# Patient Record
Sex: MALE | Race: WHITE | NOT HISPANIC OR LATINO | URBAN - METROPOLITAN AREA
[De-identification: names, ages, dates, MRNs, and addresses within clinical notes are randomized per-mention and may not be internally consistent; named-entity substitution may affect disease eponyms.]

---

## 2017-06-05 ENCOUNTER — OUTPATIENT (OUTPATIENT)
Dept: OUTPATIENT SERVICES | Facility: HOSPITAL | Age: 72
LOS: 1 days | End: 2017-06-05
Payer: MEDICARE

## 2017-06-05 VITALS
HEART RATE: 82 BPM | WEIGHT: 291.89 LBS | DIASTOLIC BLOOD PRESSURE: 80 MMHG | OXYGEN SATURATION: 99 % | SYSTOLIC BLOOD PRESSURE: 102 MMHG | RESPIRATION RATE: 14 BRPM | HEIGHT: 76 IN | TEMPERATURE: 99 F

## 2017-06-05 DIAGNOSIS — Z90.89 ACQUIRED ABSENCE OF OTHER ORGANS: Chronic | ICD-10-CM

## 2017-06-05 DIAGNOSIS — Z96.7 PRESENCE OF OTHER BONE AND TENDON IMPLANTS: Chronic | ICD-10-CM

## 2017-06-05 DIAGNOSIS — Z90.49 ACQUIRED ABSENCE OF OTHER SPECIFIED PARTS OF DIGESTIVE TRACT: Chronic | ICD-10-CM

## 2017-06-05 DIAGNOSIS — I48.91 UNSPECIFIED ATRIAL FIBRILLATION: ICD-10-CM

## 2017-06-05 DIAGNOSIS — Z98.89 OTHER SPECIFIED POSTPROCEDURAL STATES: Chronic | ICD-10-CM

## 2017-06-05 DIAGNOSIS — N40.1 BENIGN PROSTATIC HYPERPLASIA WITH LOWER URINARY TRACT SYMPTOMS: ICD-10-CM

## 2017-06-05 DIAGNOSIS — N40.0 BENIGN PROSTATIC HYPERPLASIA WITHOUT LOWER URINARY TRACT SYMPTOMS: ICD-10-CM

## 2017-06-05 DIAGNOSIS — Z96.649 PRESENCE OF UNSPECIFIED ARTIFICIAL HIP JOINT: Chronic | ICD-10-CM

## 2017-06-05 LAB
APPEARANCE UR: CLEAR — SIGNIFICANT CHANGE UP
BACTERIA # UR AUTO: SIGNIFICANT CHANGE UP
BILIRUB UR-MCNC: NEGATIVE — SIGNIFICANT CHANGE UP
BLD GP AB SCN SERPL QL: NEGATIVE — SIGNIFICANT CHANGE UP
BLOOD UR QL VISUAL: NEGATIVE — SIGNIFICANT CHANGE UP
BUN SERPL-MCNC: 22 MG/DL — SIGNIFICANT CHANGE UP (ref 7–23)
CALCIUM SERPL-MCNC: 9.9 MG/DL — SIGNIFICANT CHANGE UP (ref 8.4–10.5)
CHLORIDE SERPL-SCNC: 99 MMOL/L — SIGNIFICANT CHANGE UP (ref 98–107)
CO2 SERPL-SCNC: 27 MMOL/L — SIGNIFICANT CHANGE UP (ref 22–31)
COD CRY URNS QL: SIGNIFICANT CHANGE UP (ref 0–0)
COLOR SPEC: YELLOW — SIGNIFICANT CHANGE UP
CREAT SERPL-MCNC: 1.2 MG/DL — SIGNIFICANT CHANGE UP (ref 0.5–1.3)
GLUCOSE SERPL-MCNC: 115 MG/DL — HIGH (ref 70–99)
GLUCOSE UR-MCNC: NEGATIVE — SIGNIFICANT CHANGE UP
HCT VFR BLD CALC: 50.8 % — HIGH (ref 39–50)
HGB BLD-MCNC: 17.5 G/DL — HIGH (ref 13–17)
KETONES UR-MCNC: NEGATIVE — SIGNIFICANT CHANGE UP
LEUKOCYTE ESTERASE UR-ACNC: NEGATIVE — SIGNIFICANT CHANGE UP
MCHC RBC-ENTMCNC: 32.4 PG — SIGNIFICANT CHANGE UP (ref 27–34)
MCHC RBC-ENTMCNC: 34.4 % — SIGNIFICANT CHANGE UP (ref 32–36)
MCV RBC AUTO: 94.1 FL — SIGNIFICANT CHANGE UP (ref 80–100)
MUCOUS THREADS # UR AUTO: SIGNIFICANT CHANGE UP
NITRITE UR-MCNC: NEGATIVE — SIGNIFICANT CHANGE UP
PH UR: 6 — SIGNIFICANT CHANGE UP (ref 4.6–8)
PLATELET # BLD AUTO: 172 K/UL — SIGNIFICANT CHANGE UP (ref 150–400)
PMV BLD: 11.1 FL — SIGNIFICANT CHANGE UP (ref 7–13)
POTASSIUM SERPL-MCNC: 4.7 MMOL/L — SIGNIFICANT CHANGE UP (ref 3.5–5.3)
POTASSIUM SERPL-SCNC: 4.7 MMOL/L — SIGNIFICANT CHANGE UP (ref 3.5–5.3)
PROT UR-MCNC: 20 — SIGNIFICANT CHANGE UP
RBC # BLD: 5.4 M/UL — SIGNIFICANT CHANGE UP (ref 4.2–5.8)
RBC # FLD: 13.1 % — SIGNIFICANT CHANGE UP (ref 10.3–14.5)
RBC CASTS # UR COMP ASSIST: SIGNIFICANT CHANGE UP (ref 0–?)
RH IG SCN BLD-IMP: POSITIVE — SIGNIFICANT CHANGE UP
SODIUM SERPL-SCNC: 141 MMOL/L — SIGNIFICANT CHANGE UP (ref 135–145)
SP GR SPEC: 1.02 — SIGNIFICANT CHANGE UP (ref 1–1.03)
UROBILINOGEN FLD QL: NORMAL E.U. — SIGNIFICANT CHANGE UP (ref 0.1–0.2)
WBC # BLD: 7.22 K/UL — SIGNIFICANT CHANGE UP (ref 3.8–10.5)
WBC # FLD AUTO: 7.22 K/UL — SIGNIFICANT CHANGE UP (ref 3.8–10.5)
WBC UR QL: SIGNIFICANT CHANGE UP (ref 0–?)

## 2017-06-05 PROCEDURE — 93010 ELECTROCARDIOGRAM REPORT: CPT

## 2017-06-05 NOTE — H&P PST ADULT - NEGATIVE GASTROINTESTINAL SYMPTOMS
no change in bowel habits/no abdominal pain no constipation/no diarrhea/no abdominal pain/no change in bowel habits/no nausea/no vomiting

## 2017-06-05 NOTE — H&P PST ADULT - NEGATIVE GENERAL SYMPTOMS
no sweating/no anorexia/no weight loss/no malaise/no fever/no fatigue/no chills/no weight gain no anorexia/no polyuria/no fever/no weight loss/no chills/no fatigue/no weight gain/no polyphagia/no polydipsia/no sweating/no malaise

## 2017-06-05 NOTE — H&P PST ADULT - NEGATIVE ENMT SYMPTOMS
no throat pain/no nose bleeds/no hearing difficulty/no tinnitus/no nasal obstruction/no recurrent cold sores/no dry mouth/no nasal discharge/no nasal congestion/no abnormal taste sensation/no ear pain/no gum bleeding/no sinus symptoms/no vertigo

## 2017-06-05 NOTE — H&P PST ADULT - PROBLEM SELECTOR PLAN 2
hx afib, cardiac stent  as per cardiology clearance pt can stop xarelto 5 days prior to procedure last dose 6/10/2017, hx cardiac stent instructed to continue asa

## 2017-06-05 NOTE — H&P PST ADULT - HISTORY OF PRESENT ILLNESS
71y/o male scheduled for cystoscopy, transurethral resection of the prostate on 6/16/2017.  Pt states, "since having right hip replacement developed urinary retention.  F/u showed enlarged prostate.  Continues to c/o slow stream, hesitancy's."

## 2017-06-05 NOTE — H&P PST ADULT - NEGATIVE PSYCHIATRIC SYMPTOMS
no insomnia/no memory loss/no mood swings/no paranoia/no depression/no anxiety no insomnia/no anxiety/no depression/no suicidal ideation

## 2017-06-05 NOTE — H&P PST ADULT - MUSCULOSKELETAL
details… detailed exam no joint swelling/decreased ROM due to pain/no calf tenderness/no joint erythema/no joint warmth normal strength/ROM intact

## 2017-06-05 NOTE — H&P PST ADULT - PROBLEM SELECTOR PLAN 1
Pt scheduled for cystoscopy, transurethral resection of the prostate on 6/16/2017.  labs done cbc, bmp, t&s, ua, c/s results pending, ekg done.  Pt went for cardiology clearance.  Preop teaching done, pt able to verbalize understanding.

## 2017-06-05 NOTE — H&P PST ADULT - NSANTHOSAYNRD_GEN_A_CORE
No. ROCIO screening performed.  STOP BANG Legend: 0-2 = LOW Risk; 3-4 = INTERMEDIATE Risk; 5-8 = HIGH Risk

## 2017-06-05 NOTE — H&P PST ADULT - NEGATIVE GENERAL GENITOURINARY SYMPTOMS
no dysuria/no bladder infections/no incontinence no flank pain R/no dysuria/no hematuria/no flank pain L/no incontinence/no bladder infections

## 2017-06-05 NOTE — H&P PST ADULT - NEGATIVE SKIN SYMPTOMS
no pitted nails/no hair loss/no brittle nails/no rash/no itching no rash/no pitted nails/no hair loss/no brittle nails/no itching/no dryness

## 2017-06-05 NOTE — H&P PST ADULT - VISION (WITH CORRECTIVE LENSES IF THE PATIENT USUALLY WEARS THEM):
wears reading glasses/Normal vision: sees adequately in most situations; can see medication labels, newsprint

## 2017-06-05 NOTE — H&P PST ADULT - RS GEN PE MLT RESP DETAILS PC
no subcutaneous emphysema/clear to auscultation bilaterally/no wheezes/no rales/airway patent/no intercostal retractions/good air movement/breath sounds equal/respirations non-labored/no rhonchi respirations non-labored/clear to auscultation bilaterally/no wheezes/breath sounds equal/good air movement/no rhonchi/no rales/no subcutaneous emphysema/no intercostal retractions

## 2017-06-05 NOTE — H&P PST ADULT - NEGATIVE LYMPHATIC SYMPTOMS
no enlarged lymph nodes/no tender lymph nodes no tender lymph nodes/no swelling of extremity/no enlarged lymph nodes

## 2017-06-05 NOTE — H&P PST ADULT - NEGATIVE BREAST SYMPTOMS
no breast tenderness L/no breast lump L/no breast lump R/no nipple discharge R/no nipple discharge L/no breast tenderness R

## 2017-06-05 NOTE — H&P PST ADULT - PMH
Atrial fibrillation    BPH (benign prostatic hypertrophy)    CAD (coronary artery disease)    Dyslipidemia    Glaucoma    Hip pain, right    Hypertension    Obesity    Osteoarthritis of right hip    Stented coronary artery  X1, 2015  Varicose vein of leg  right Atrial fibrillation    Benign prostatic hyperplasia    BPH (benign prostatic hypertrophy)    CAD (coronary artery disease)    Dyslipidemia    Glaucoma    Hip pain, right    Hypertension    Obesity    Osteoarthritis of right hip    Stented coronary artery  X1, 2015  Varicose vein of leg  right

## 2017-06-05 NOTE — H&P PST ADULT - FAMILY HISTORY
Mother  Still living? No  Family history of aortic aneurysm, Age at diagnosis: Age Unknown     Father  Still living? No  Family history of colon cancer, Age at diagnosis: Age Unknown

## 2017-06-05 NOTE — H&P PST ADULT - PSH
S/P angioplasty with stent  1 stent LAD 9/29/2015  S/P cholecystectomy  2006  S/P ORIF (open reduction internal fixation) fracture  right ankle   2003  S/P tonsillectomy  childhood S/P angioplasty with stent  1 stent LAD 9/29/2015  S/P cholecystectomy  2006  S/P hip replacement  right hip replacement 5/2016  S/P ORIF (open reduction internal fixation) fracture  right ankle   2003  S/P tonsillectomy  childhood

## 2017-06-05 NOTE — H&P PST ADULT - NEGATIVE OPHTHALMOLOGIC SYMPTOMS
no diplopia/no photophobia/no loss of vision L no diplopia/no discharge L/no discharge R/no pain L/no loss of vision L/no lacrimation R/no blurred vision L/no pain R/no irritation L/no lacrimation L/no photophobia/no loss of vision R/no scleral injection R/no scleral injection L/no blurred vision R/no irritation R

## 2017-06-05 NOTE — H&P PST ADULT - VENOUS THROMBOEMBOLISM CURRENT STATUS
elective major lower extremity arthroplasty/varicose veins/swollen legs major surgery lasting over 3 hrs

## 2017-06-05 NOTE — H&P PST ADULT - NEGATIVE CARDIOVASCULAR SYMPTOMS
no dyspnea on exertion/no peripheral edema no paroxysmal nocturnal dyspnea/no dyspnea on exertion/no claudication/no peripheral edema/no orthopnea/no palpitations/no chest pain

## 2017-06-05 NOTE — H&P PST ADULT - GASTROINTESTINAL DETAILS
no distention/bowel sounds normal/no rigidity/no rebound tenderness/no organomegaly/no bruit/no masses palpable/nontender/soft no bruit/soft/no guarding/nontender/bowel sounds normal/obese/no masses palpable/no organomegaly/no rigidity/no rebound tenderness

## 2017-06-07 LAB
BACTERIA UR CULT: SIGNIFICANT CHANGE UP
SPECIMEN SOURCE: SIGNIFICANT CHANGE UP

## 2017-06-28 ENCOUNTER — OUTPATIENT (OUTPATIENT)
Dept: OUTPATIENT SERVICES | Facility: HOSPITAL | Age: 72
LOS: 1 days | End: 2017-06-28

## 2017-06-28 VITALS
WEIGHT: 296.96 LBS | SYSTOLIC BLOOD PRESSURE: 116 MMHG | DIASTOLIC BLOOD PRESSURE: 76 MMHG | HEART RATE: 81 BPM | RESPIRATION RATE: 16 BRPM | OXYGEN SATURATION: 98 % | TEMPERATURE: 98 F | HEIGHT: 76 IN

## 2017-06-28 DIAGNOSIS — Z90.49 ACQUIRED ABSENCE OF OTHER SPECIFIED PARTS OF DIGESTIVE TRACT: Chronic | ICD-10-CM

## 2017-06-28 DIAGNOSIS — Z96.7 PRESENCE OF OTHER BONE AND TENDON IMPLANTS: Chronic | ICD-10-CM

## 2017-06-28 DIAGNOSIS — Z96.649 PRESENCE OF UNSPECIFIED ARTIFICIAL HIP JOINT: Chronic | ICD-10-CM

## 2017-06-28 DIAGNOSIS — N40.1 BENIGN PROSTATIC HYPERPLASIA WITH LOWER URINARY TRACT SYMPTOMS: ICD-10-CM

## 2017-06-28 DIAGNOSIS — Z90.89 ACQUIRED ABSENCE OF OTHER ORGANS: Chronic | ICD-10-CM

## 2017-06-28 DIAGNOSIS — Z98.89 OTHER SPECIFIED POSTPROCEDURAL STATES: Chronic | ICD-10-CM

## 2017-06-28 LAB
APPEARANCE UR: CLEAR — SIGNIFICANT CHANGE UP
BILIRUB UR-MCNC: NEGATIVE — SIGNIFICANT CHANGE UP
BLD GP AB SCN SERPL QL: NEGATIVE — SIGNIFICANT CHANGE UP
BLOOD UR QL VISUAL: NEGATIVE — SIGNIFICANT CHANGE UP
BUN SERPL-MCNC: 21 MG/DL — SIGNIFICANT CHANGE UP (ref 7–23)
CALCIUM SERPL-MCNC: 10 MG/DL — SIGNIFICANT CHANGE UP (ref 8.4–10.5)
CHLORIDE SERPL-SCNC: 101 MMOL/L — SIGNIFICANT CHANGE UP (ref 98–107)
CO2 SERPL-SCNC: 30 MMOL/L — SIGNIFICANT CHANGE UP (ref 22–31)
COLOR SPEC: SIGNIFICANT CHANGE UP
CREAT SERPL-MCNC: 1.18 MG/DL — SIGNIFICANT CHANGE UP (ref 0.5–1.3)
GLUCOSE SERPL-MCNC: 126 MG/DL — HIGH (ref 70–99)
GLUCOSE UR-MCNC: NEGATIVE — SIGNIFICANT CHANGE UP
HCT VFR BLD CALC: 52 % — HIGH (ref 39–50)
HGB BLD-MCNC: 17.9 G/DL — HIGH (ref 13–17)
KETONES UR-MCNC: NEGATIVE — SIGNIFICANT CHANGE UP
LEUKOCYTE ESTERASE UR-ACNC: NEGATIVE — SIGNIFICANT CHANGE UP
MCHC RBC-ENTMCNC: 32.5 PG — SIGNIFICANT CHANGE UP (ref 27–34)
MCHC RBC-ENTMCNC: 34.4 % — SIGNIFICANT CHANGE UP (ref 32–36)
MCV RBC AUTO: 94.4 FL — SIGNIFICANT CHANGE UP (ref 80–100)
MUCOUS THREADS # UR AUTO: SIGNIFICANT CHANGE UP
NITRITE UR-MCNC: NEGATIVE — SIGNIFICANT CHANGE UP
PH UR: 6 — SIGNIFICANT CHANGE UP (ref 4.6–8)
PLATELET # BLD AUTO: 176 K/UL — SIGNIFICANT CHANGE UP (ref 150–400)
PMV BLD: 11.1 FL — SIGNIFICANT CHANGE UP (ref 7–13)
POTASSIUM SERPL-MCNC: 4 MMOL/L — SIGNIFICANT CHANGE UP (ref 3.5–5.3)
POTASSIUM SERPL-SCNC: 4 MMOL/L — SIGNIFICANT CHANGE UP (ref 3.5–5.3)
PROT UR-MCNC: NEGATIVE — SIGNIFICANT CHANGE UP
RBC # BLD: 5.51 M/UL — SIGNIFICANT CHANGE UP (ref 4.2–5.8)
RBC # FLD: 13.1 % — SIGNIFICANT CHANGE UP (ref 10.3–14.5)
RH IG SCN BLD-IMP: POSITIVE — SIGNIFICANT CHANGE UP
SODIUM SERPL-SCNC: 143 MMOL/L — SIGNIFICANT CHANGE UP (ref 135–145)
SP GR SPEC: 1.01 — SIGNIFICANT CHANGE UP (ref 1–1.03)
UROBILINOGEN FLD QL: NORMAL E.U. — SIGNIFICANT CHANGE UP (ref 0.1–0.2)
WBC # BLD: 7.56 K/UL — SIGNIFICANT CHANGE UP (ref 3.8–10.5)
WBC # FLD AUTO: 7.56 K/UL — SIGNIFICANT CHANGE UP (ref 3.8–10.5)

## 2017-06-28 RX ORDER — ASPIRIN/CALCIUM CARB/MAGNESIUM 324 MG
1 TABLET ORAL
Qty: 0 | Refills: 0 | COMMUNITY

## 2017-06-28 RX ORDER — ATORVASTATIN CALCIUM 80 MG/1
1 TABLET, FILM COATED ORAL
Qty: 0 | Refills: 0 | COMMUNITY

## 2017-06-28 RX ORDER — FINASTERIDE 5 MG/1
1 TABLET, FILM COATED ORAL
Qty: 0 | Refills: 0 | COMMUNITY

## 2017-06-28 RX ORDER — BENAZEPRIL HYDROCHLORIDE 40 MG/1
1 TABLET ORAL
Qty: 0 | Refills: 0 | COMMUNITY

## 2017-06-28 RX ORDER — RIVAROXABAN 15 MG-20MG
1 KIT ORAL
Qty: 0 | Refills: 0 | COMMUNITY

## 2017-06-28 RX ORDER — FUROSEMIDE 40 MG
1 TABLET ORAL
Qty: 0 | Refills: 0 | COMMUNITY

## 2017-06-28 NOTE — H&P PST ADULT - VISION (WITH CORRECTIVE LENSES IF THE PATIENT USUALLY WEARS THEM):
Normal vision: sees adequately in most situations; can see medication labels, newsprint/wears reading glasses

## 2017-06-28 NOTE — H&P PST ADULT - PROBLEM SELECTOR PLAN 2
hx afib, cardiac stent  as per cardiology clearance pt can stop xarelto 5 days prior to procedure last dose 6/10/2017, hx cardiac stent instructed to continue asa hx afib, cardiac stent as per cardiology clearance pt can stop xarelto 5 days prior to procedure last dose 7/4/17, hx cardiac stent instructed to continue asa. Pending cardiac clearance with copy of echo and stress from 2017. OR booking notified of stent

## 2017-06-28 NOTE — H&P PST ADULT - NEGATIVE PSYCHIATRIC SYMPTOMS
no anxiety/no depression/no insomnia/no suicidal ideation no anxiety/no depression/no suicidal ideation

## 2017-06-28 NOTE — H&P PST ADULT - NEGATIVE GASTROINTESTINAL SYMPTOMS
no diarrhea/no constipation/no nausea/no abdominal pain/no vomiting/no change in bowel habits no abdominal pain/no vomiting/no change in bowel habits/no diarrhea/no nausea/no melena/no constipation

## 2017-06-28 NOTE — H&P PST ADULT - CARDIOVASCULAR COMMENTS
cardiac stent LAD 2015 atrial fibrillation cardiac stent LAD 2015, Afib - on xarelto and rate control

## 2017-06-28 NOTE — H&P PST ADULT - MUSCULOSKELETAL
details… detailed exam ROM intact/normal strength no joint swelling/no joint erythema/no calf tenderness/no joint warmth/ROM intact/normal strength

## 2017-06-28 NOTE — H&P PST ADULT - PSH
S/P angioplasty with stent  1 stent LAD 9/2015  S/P cholecystectomy  2006  S/P hip replacement  right hip replacement 5/2016  S/P ORIF (open reduction internal fixation) fracture  right ankle   2003  S/P tonsillectomy  childhood

## 2017-06-28 NOTE — H&P PST ADULT - PMH
Atrial fibrillation    Benign prostatic hyperplasia    BPH (benign prostatic hypertrophy)    CAD (coronary artery disease)    Dyslipidemia    Glaucoma    Hip pain, right    Hypertension    Obesity    Osteoarthritis of right hip    Stented coronary artery  bare metal stent, 9/2015  Varicose vein of leg  right

## 2017-06-28 NOTE — H&P PST ADULT - NEGATIVE GENERAL SYMPTOMS
no chills/no fever/no weight gain/no polydipsia/no malaise/no polyuria/no weight loss/no fatigue/no sweating/no polyphagia/no anorexia

## 2017-06-28 NOTE — H&P PST ADULT - NS MD HP INPLANTS MED DEV
right ankle 2 screws, right hip replacement right ankle 2 screws, right hip replacement, bare metal stent

## 2017-06-28 NOTE — H&P PST ADULT - MALLAMPATI CLASS
Class IV (difficult) - the soft palate is not visible at all Class II - visualization of the soft palate, fauces, and uvula

## 2017-06-28 NOTE — H&P PST ADULT - PROBLEM SELECTOR PLAN 1
Pt scheduled for cystoscopy, transurethral resection of the prostate on 6/16/2017.  labs done cbc, bmp, t&s, ua, c/s results pending, ekg done.  Pt went for cardiology clearance.  Preop teaching done, pt able to verbalize understanding. Pt scheduled for cystoscopy, transurethral resection of the prostate on 7/10/17. Pt went for cardiology clearance. Preop teaching done, pt able to verbalize understanding. ROCIO precaution OR booking notified

## 2017-06-28 NOTE — H&P PST ADULT - RS GEN PE MLT RESP DETAILS PC
no rhonchi/respirations non-labored/no rales/good air movement/breath sounds equal/no intercostal retractions/clear to auscultation bilaterally/no subcutaneous emphysema/no wheezes no rhonchi/breath sounds equal/clear to auscultation bilaterally/no intercostal retractions/no rales/no wheezes/no chest wall tenderness/airway patent/respirations non-labored/no subcutaneous emphysema/good air movement

## 2017-06-28 NOTE — H&P PST ADULT - NEGATIVE CARDIOVASCULAR SYMPTOMS
no claudication/no paroxysmal nocturnal dyspnea/no peripheral edema/no palpitations/no orthopnea/no chest pain/no dyspnea on exertion

## 2017-06-28 NOTE — H&P PST ADULT - GASTROINTESTINAL DETAILS
no rigidity/no masses palpable/obese/no bruit/nontender/no guarding/no rebound tenderness/bowel sounds normal/no organomegaly/soft bowel sounds normal/no rebound tenderness/no organomegaly/no masses palpable/no rigidity/no bruit/nontender/no distention/no guarding/obese/soft

## 2017-06-28 NOTE — H&P PST ADULT - NEGATIVE NEUROLOGICAL SYMPTOMS
no tremors/no loss of sensation/no difficulty walking/no headache/no weakness/no vertigo no loss of consciousness/no facial palsy/no confusion/no weakness/no generalized seizures/no tremors/no vertigo/no loss of sensation/no headache/no difficulty walking

## 2017-06-28 NOTE — H&P PST ADULT - NEGATIVE OPHTHALMOLOGIC SYMPTOMS
no diplopia/no scleral injection L/no lacrimation R/no scleral injection R/no pain R/no loss of vision R/no loss of vision L/no pain L/no irritation R/no irritation L/no photophobia/no lacrimation L/no discharge R/no blurred vision R/no blurred vision L/no discharge L no diplopia/no pain L/no pain R/no blurred vision R/no loss of vision L/no blurred vision L/no discharge L/no scleral injection L/no scleral injection R/no loss of vision R/no photophobia/no discharge R

## 2017-06-28 NOTE — H&P PST ADULT - HISTORY OF PRESENT ILLNESS
71y/o male scheduled for cystoscopy, transurethral resection of the prostate on 7/10/17. Surgery was rescheduled by surgeon from 6/16/17. Pt denies changes in health since last visit. Complaining of urinary retention, hesitancy, and slow stream related to enlarged prostate.

## 2017-06-28 NOTE — H&P PST ADULT - NEGATIVE ENMT SYMPTOMS
no nasal obstruction/no vertigo/no ear pain/no nasal congestion/no tinnitus/no gum bleeding/no sinus symptoms/no abnormal taste sensation/no nose bleeds/no throat pain/no hearing difficulty/no dry mouth/no nasal discharge/no recurrent cold sores no tinnitus/no nose bleeds/no nasal discharge/no recurrent cold sores/no ear pain/no dysphagia/no hearing difficulty/no vertigo/no nasal congestion/no nasal obstruction/no sinus symptoms/no throat pain

## 2017-06-28 NOTE — H&P PST ADULT - NEGATIVE BREAST SYMPTOMS
no breast lump L/no breast lump R/no breast tenderness L/no nipple discharge L/no nipple discharge R/no breast tenderness R no breast tenderness R/no breast lump R/no breast tenderness L/no breast lump L

## 2017-06-28 NOTE — H&P PST ADULT - NEGATIVE GENERAL GENITOURINARY SYMPTOMS
no bladder infections/no flank pain L/no incontinence/no hematuria/no flank pain R/no dysuria no flank pain R/no flank pain L/no dysuria/no bladder infections/no incontinence/no hematuria/no nocturia

## 2017-06-30 LAB — SPECIMEN SOURCE: SIGNIFICANT CHANGE UP

## 2017-07-01 LAB — BACTERIA UR CULT: SIGNIFICANT CHANGE UP

## 2017-07-10 ENCOUNTER — RESULT REVIEW (OUTPATIENT)
Age: 72
End: 2017-07-10

## 2017-07-10 ENCOUNTER — INPATIENT (INPATIENT)
Facility: HOSPITAL | Age: 72
LOS: 0 days | Discharge: HOME | End: 2017-07-11
Attending: UROLOGY | Admitting: UROLOGY
Payer: MEDICARE

## 2017-07-10 VITALS
TEMPERATURE: 97 F | RESPIRATION RATE: 16 BRPM | HEIGHT: 76 IN | DIASTOLIC BLOOD PRESSURE: 75 MMHG | HEART RATE: 77 BPM | SYSTOLIC BLOOD PRESSURE: 104 MMHG | WEIGHT: 296.96 LBS | OXYGEN SATURATION: 96 %

## 2017-07-10 DIAGNOSIS — N40.1 BENIGN PROSTATIC HYPERPLASIA WITH LOWER URINARY TRACT SYMPTOMS: ICD-10-CM

## 2017-07-10 DIAGNOSIS — Z90.49 ACQUIRED ABSENCE OF OTHER SPECIFIED PARTS OF DIGESTIVE TRACT: Chronic | ICD-10-CM

## 2017-07-10 DIAGNOSIS — Z98.89 OTHER SPECIFIED POSTPROCEDURAL STATES: Chronic | ICD-10-CM

## 2017-07-10 DIAGNOSIS — I25.10 ATHEROSCLEROTIC HEART DISEASE OF NATIVE CORONARY ARTERY WITHOUT ANGINA PECTORIS: ICD-10-CM

## 2017-07-10 DIAGNOSIS — R31.9 HEMATURIA, UNSPECIFIED: ICD-10-CM

## 2017-07-10 DIAGNOSIS — Z29.9 ENCOUNTER FOR PROPHYLACTIC MEASURES, UNSPECIFIED: ICD-10-CM

## 2017-07-10 DIAGNOSIS — I48.91 UNSPECIFIED ATRIAL FIBRILLATION: ICD-10-CM

## 2017-07-10 DIAGNOSIS — I10 ESSENTIAL (PRIMARY) HYPERTENSION: ICD-10-CM

## 2017-07-10 DIAGNOSIS — Z96.649 PRESENCE OF UNSPECIFIED ARTIFICIAL HIP JOINT: Chronic | ICD-10-CM

## 2017-07-10 DIAGNOSIS — Z96.7 PRESENCE OF OTHER BONE AND TENDON IMPLANTS: Chronic | ICD-10-CM

## 2017-07-10 DIAGNOSIS — D62 ACUTE POSTHEMORRHAGIC ANEMIA: ICD-10-CM

## 2017-07-10 DIAGNOSIS — Z90.89 ACQUIRED ABSENCE OF OTHER ORGANS: Chronic | ICD-10-CM

## 2017-07-10 DIAGNOSIS — E78.5 HYPERLIPIDEMIA, UNSPECIFIED: ICD-10-CM

## 2017-07-10 LAB
BASE EXCESS BLDV CALC-SCNC: 1.9 MMOL/L — SIGNIFICANT CHANGE UP
CA-I SERPL-SCNC: 1.24 MMOL/L — SIGNIFICANT CHANGE UP (ref 1.15–1.29)
GAS PNL BLDV: 138 MMOL/L — SIGNIFICANT CHANGE UP (ref 136–146)
GLUCOSE BLDV-MCNC: 135 — HIGH (ref 70–99)
HCO3 BLDV-SCNC: 25 MMOL/L — SIGNIFICANT CHANGE UP (ref 20–27)
HCT VFR BLDV CALC: 51.3 % — HIGH (ref 39–51)
HGB BLDV-MCNC: 16.8 G/DL — SIGNIFICANT CHANGE UP (ref 13–17)
PCO2 BLDV: 50 MMHG — SIGNIFICANT CHANGE UP (ref 41–51)
PH BLDV: 7.35 PH — SIGNIFICANT CHANGE UP (ref 7.32–7.43)
PO2 BLDV: 53 MMHG — HIGH (ref 35–40)
POTASSIUM BLDV-SCNC: 4.5 MMOL/L — SIGNIFICANT CHANGE UP (ref 3.4–4.5)
SAO2 % BLDV: 87.9 % — HIGH (ref 60–85)

## 2017-07-10 PROCEDURE — 88307 TISSUE EXAM BY PATHOLOGIST: CPT | Mod: 26

## 2017-07-10 PROCEDURE — 88305 TISSUE EXAM BY PATHOLOGIST: CPT | Mod: 26

## 2017-07-10 PROCEDURE — 99223 1ST HOSP IP/OBS HIGH 75: CPT

## 2017-07-10 PROCEDURE — 52601 PROSTATECTOMY (TURP): CPT

## 2017-07-10 RX ORDER — LATANOPROST 0.05 MG/ML
1 SOLUTION/ DROPS OPHTHALMIC; TOPICAL AT BEDTIME
Qty: 0 | Refills: 0 | Status: DISCONTINUED | OUTPATIENT
Start: 2017-07-10 | End: 2017-07-11

## 2017-07-10 RX ORDER — FENTANYL CITRATE 50 UG/ML
25 INJECTION INTRAVENOUS
Qty: 0 | Refills: 0 | Status: DISCONTINUED | OUTPATIENT
Start: 2017-07-10 | End: 2017-07-10

## 2017-07-10 RX ORDER — SODIUM CHLORIDE 9 MG/ML
1000 INJECTION, SOLUTION INTRAVENOUS
Qty: 0 | Refills: 0 | Status: DISCONTINUED | OUTPATIENT
Start: 2017-07-10 | End: 2017-07-10

## 2017-07-10 RX ORDER — METOPROLOL TARTRATE 50 MG
50 TABLET ORAL DAILY
Qty: 0 | Refills: 0 | Status: DISCONTINUED | OUTPATIENT
Start: 2017-07-10 | End: 2017-07-11

## 2017-07-10 RX ORDER — CEFAZOLIN SODIUM 1 G
1000 VIAL (EA) INJECTION ONCE
Qty: 0 | Refills: 0 | Status: COMPLETED | OUTPATIENT
Start: 2017-07-10 | End: 2017-07-10

## 2017-07-10 RX ORDER — FINASTERIDE 5 MG/1
5 TABLET, FILM COATED ORAL DAILY
Qty: 0 | Refills: 0 | Status: DISCONTINUED | OUTPATIENT
Start: 2017-07-10 | End: 2017-07-11

## 2017-07-10 RX ORDER — ACETAMINOPHEN 500 MG
650 TABLET ORAL EVERY 6 HOURS
Qty: 0 | Refills: 0 | Status: DISCONTINUED | OUTPATIENT
Start: 2017-07-10 | End: 2017-07-11

## 2017-07-10 RX ORDER — ATORVASTATIN CALCIUM 80 MG/1
20 TABLET, FILM COATED ORAL AT BEDTIME
Qty: 0 | Refills: 0 | Status: DISCONTINUED | OUTPATIENT
Start: 2017-07-10 | End: 2017-07-11

## 2017-07-10 RX ORDER — CEFAZOLIN SODIUM 1 G
1000 VIAL (EA) INJECTION EVERY 8 HOURS
Qty: 0 | Refills: 0 | Status: DISCONTINUED | OUTPATIENT
Start: 2017-07-10 | End: 2017-07-11

## 2017-07-10 RX ORDER — DOCUSATE SODIUM 100 MG
100 CAPSULE ORAL THREE TIMES A DAY
Qty: 0 | Refills: 0 | Status: DISCONTINUED | OUTPATIENT
Start: 2017-07-10 | End: 2017-07-11

## 2017-07-10 RX ORDER — ONDANSETRON 8 MG/1
4 TABLET, FILM COATED ORAL EVERY 6 HOURS
Qty: 0 | Refills: 0 | Status: DISCONTINUED | OUTPATIENT
Start: 2017-07-10 | End: 2017-07-11

## 2017-07-10 RX ORDER — OXYCODONE HYDROCHLORIDE 5 MG/1
10 TABLET ORAL EVERY 4 HOURS
Qty: 0 | Refills: 0 | Status: DISCONTINUED | OUTPATIENT
Start: 2017-07-10 | End: 2017-07-11

## 2017-07-10 RX ORDER — OXYCODONE HYDROCHLORIDE 5 MG/1
5 TABLET ORAL EVERY 4 HOURS
Qty: 0 | Refills: 0 | Status: DISCONTINUED | OUTPATIENT
Start: 2017-07-10 | End: 2017-07-11

## 2017-07-10 RX ORDER — LISINOPRIL 2.5 MG/1
20 TABLET ORAL DAILY
Qty: 0 | Refills: 0 | Status: DISCONTINUED | OUTPATIENT
Start: 2017-07-10 | End: 2017-07-11

## 2017-07-10 RX ORDER — TAMSULOSIN HYDROCHLORIDE 0.4 MG/1
0.8 CAPSULE ORAL AT BEDTIME
Qty: 0 | Refills: 0 | Status: DISCONTINUED | OUTPATIENT
Start: 2017-07-10 | End: 2017-07-11

## 2017-07-10 RX ORDER — ASPIRIN/CALCIUM CARB/MAGNESIUM 324 MG
81 TABLET ORAL DAILY
Qty: 0 | Refills: 0 | Status: DISCONTINUED | OUTPATIENT
Start: 2017-07-10 | End: 2017-07-11

## 2017-07-10 RX ORDER — CEFAZOLIN SODIUM 1 G
VIAL (EA) INJECTION
Qty: 0 | Refills: 0 | Status: DISCONTINUED | OUTPATIENT
Start: 2017-07-10 | End: 2017-07-11

## 2017-07-10 RX ORDER — FUROSEMIDE 40 MG
20 TABLET ORAL DAILY
Qty: 0 | Refills: 0 | Status: DISCONTINUED | OUTPATIENT
Start: 2017-07-10 | End: 2017-07-11

## 2017-07-10 RX ORDER — SODIUM CHLORIDE 9 MG/ML
1000 INJECTION, SOLUTION INTRAVENOUS
Qty: 0 | Refills: 0 | Status: DISCONTINUED | OUTPATIENT
Start: 2017-07-10 | End: 2017-07-11

## 2017-07-10 RX ORDER — SENNA PLUS 8.6 MG/1
2 TABLET ORAL AT BEDTIME
Qty: 0 | Refills: 0 | Status: DISCONTINUED | OUTPATIENT
Start: 2017-07-10 | End: 2017-07-11

## 2017-07-10 RX ADMIN — SODIUM CHLORIDE 125 MILLILITER(S): 9 INJECTION, SOLUTION INTRAVENOUS at 22:40

## 2017-07-10 RX ADMIN — ATORVASTATIN CALCIUM 20 MILLIGRAM(S): 80 TABLET, FILM COATED ORAL at 22:39

## 2017-07-10 RX ADMIN — Medication 100 MILLIGRAM(S): at 13:57

## 2017-07-10 RX ADMIN — SODIUM CHLORIDE 125 MILLILITER(S): 9 INJECTION, SOLUTION INTRAVENOUS at 10:23

## 2017-07-10 RX ADMIN — Medication 100 MILLIGRAM(S): at 22:40

## 2017-07-10 RX ADMIN — Medication 100 MILLIGRAM(S): at 15:47

## 2017-07-10 RX ADMIN — TAMSULOSIN HYDROCHLORIDE 0.8 MILLIGRAM(S): 0.4 CAPSULE ORAL at 22:40

## 2017-07-10 RX ADMIN — Medication 20 MILLIGRAM(S): at 13:57

## 2017-07-10 RX ADMIN — Medication 81 MILLIGRAM(S): at 17:40

## 2017-07-10 RX ADMIN — FINASTERIDE 5 MILLIGRAM(S): 5 TABLET, FILM COATED ORAL at 17:40

## 2017-07-10 RX ADMIN — Medication 50 MILLIGRAM(S): at 12:31

## 2017-07-10 NOTE — BRIEF OPERATIVE NOTE - PROCEDURE
TURBT, using monopolar cautery  07/10/2017    Active  [id number]  TURP  07/10/2017    Active  [id number]

## 2017-07-10 NOTE — CONSULT NOTE ADULT - PROBLEM SELECTOR RECOMMENDATION 3
-cont ASA 81 mg qd (pt with LAD stent placed in 9/2015)  -preop echo 4/2017 showed normal LVEF 67%, mild AI/MR, borderline pulm HTN  -cont statin and toprol 50 mg, lasix 20 mg, ACEI

## 2017-07-10 NOTE — CONSULT NOTE ADULT - PROBLEM SELECTOR RECOMMENDATION 9
-s/p TURP, TURBP on 7/10   -postop care as per , pain control  -c/w flomax/proscar  -on CBI for postop hematuria -s/p Cysto with TURP, TURBT on 7/10   -postop care as per , pain control  -c/w flomax/proscar  -on CBI for postop hematuria

## 2017-07-10 NOTE — PROGRESS NOTE ADULT - SUBJECTIVE AND OBJECTIVE BOX
Post op Check -  73 yo M POD #0 TURP    PAST MEDICAL & SURGICAL HISTORY:  Benign prostatic hyperplasia  Stented coronary artery: bare metal stent, 9/2015  Osteoarthritis of right hip  CAD (coronary artery disease)  Obesity  Varicose vein of leg: right  Hip pain, right  BPH (benign prostatic hypertrophy)  Glaucoma  Atrial fibrillation  Dyslipidemia  Hypertension  S/P hip replacement: right hip replacement 5/2016  S/P tonsillectomy: childhood  S/P angioplasty with stent: 1 stent LAD 9/2015  S/P ORIF (open reduction internal fixation) fracture: right ankle   2003  S/P cholecystectomy: 2006    Subjective:  Pt seen and examined without complaints. Pain is controlled. Denies SOB/CP/N/V.     Objective:  Vital Signs Last 24 Hrs  T(C): 36.2 (10 Jul 2017 12:00), Max: 36.6 (10 Jul 2017 09:35)  T(F): 97.2 (10 Jul 2017 12:00), Max: 97.9 (10 Jul 2017 09:35)  HR: 78 (10 Jul 2017 12:00) (64 - 97)  BP: 122/76 (10 Jul 2017 12:00) (104/69 - 124/78)  BP(mean): --  RR: 14 (10 Jul 2017 12:00) (10 - 19)  SpO2: 100% (10 Jul 2017 12:00) (96% - 100%)    Outputs:  CBI, clear    Physical Exam:  Gen: NAD  Pulm: No respiratory distress, clear to auscultation  CV: RRR  Abd: Soft, NT, ND  : Traction removed, CBI remained clear, florez resecured

## 2017-07-10 NOTE — CONSULT NOTE ADULT - SUBJECTIVE AND OBJECTIVE BOX
HPI:  73y/o male with h/o HTN, dyslipidemia, CAD s/p LAURA to LAD in 9/2015, obesity, Afib on xarelto, symptomatic BPH, admitted for cysto/TURP and TURBT today. Patient reports urinary retention, hesitancy and slow stream, nocturia prior to the surgery. He is seen postop s/p TURP, feels well other than a little chilly, denies chest pain/sob/palpitation. He is on CBI for postop hematuria.    PAST MEDICAL & SURGICAL HISTORY:  Benign prostatic hyperplasia  Stented coronary artery: bare metal stent, 9/2015  Osteoarthritis of right hip  CAD (coronary artery disease)  Obesity  Varicose vein of leg: right  Hip pain, right  BPH (benign prostatic hypertrophy)  Glaucoma  Atrial fibrillation  Dyslipidemia  Hypertension  S/P hip replacement: right hip replacement 5/2016  S/P tonsillectomy: childhood  S/P angioplasty with stent: 1 stent LAD 9/2015  S/P ORIF (open reduction internal fixation) fracture: right ankle   2003  S/P cholecystectomy: 2006      Review of Systems:   CONSTITUTIONAL: No fever, weight loss, or fatigue  EYES: No eye pain, visual disturbances, or discharge  ENMT:  No difficulty hearing, tinnitus, vertigo; No sinus or throat pain  NECK: No pain or stiffness  BREASTS: No pain, masses, or nipple discharge  RESPIRATORY: No cough, wheezing, chills or hemoptysis; No shortness of breath  CARDIOVASCULAR: No chest pain, palpitations, dizziness, or leg swelling  GASTROINTESTINAL: No abdominal or epigastric pain. No nausea, vomiting, or hematemesis; No diarrhea or constipation. No melena or hematochezia.  GENITOURINARY: No dysuria, frequency, +hesitancy/slow stream prior to surgery, postop hematuria  NEUROLOGICAL: No headaches, memory loss, loss of strength, numbness, or tremors  SKIN: No itching, burning, rashes, or lesions   LYMPH NODES: No enlarged glands  ENDOCRINE: No heat or cold intolerance; No hair loss  MUSCULOSKELETAL: No joint pain or swelling; No muscle, back, or extremity pain  PSYCHIATRIC: No depression, anxiety, mood swings, or difficulty sleeping  HEME/LYMPH: No easy bruising, or bleeding gums  ALLERY AND IMMUNOLOGIC: No hives or eczema    Allergies No Known Allergies    Intolerances    Social History: former smoker 8 pack years, quit in 1969, denies drug abuse; drinks beer 1-2 drinks 2-3x/wk    FAMILY HISTORY:  Family history of colon cancer (Father)  Family history of aortic aneurysm (Mother)    Home medications    · 	Travatan Z 0.004% ophthalmic solution: 1 drop(s) to each affected eye once a day (in the evening)  · 	Vitamin C 1000 mg oral tablet:  orally 2 times a day  · 	Colace 100 mg oral capsule:  orally 2 times a day  · 	aspirin 81 mg oral tablet: Last Dose Taken:  , 1 tab(s) orally once a day in am  · 	atorvastatin 20 mg oral tablet: Last Dose Taken:  , 1 tab(s) orally once a day in pm  · 	benazepril 20 mg oral tablet: Last Dose Taken:  , 1 tab(s) orally once a day in am  · 	Calcium 600+D oral tablet: Last Dose Taken:  , 1 tab(s) orally once a day in am  · 	finasteride 5 mg oral tablet: Last Dose Taken:  , 1 tab(s) orally once a day in pm  · 	furosemide 20 mg oral tablet: Last Dose Taken:  , 1 tab(s) orally once a day in am  · 	multivitamin: Last Dose Taken:  ,   once a day in am last dose 7/2/17  · 	tamsulosin 0.4 mg oral capsule: Last Dose Taken:  , 2 cap(s) orally once a day in pm  · 	Flax Seed Oil oral capsule: Last Dose Taken:  , 2  orally 2 times a day last dose 7/2/17  · 	Vitamin D: Last Dose Taken:  , 400 international unit(s) orally once a day in am  · 	Claritin 10 mg oral tablet: Last Dose Taken:  , 1 tab(s) orally once a day in am  · 	Metoprolol Succinate ER 50 mg oral tablet, extended release: Last Dose Taken:  , 1 tab(s) orally once a day in pm  · 	Xarelto 20 mg oral tablet: Last Dose Taken:  , 1 tab(s) orally once a day (in the evening) last dose 7/4/17    MEDICATIONS  (STANDING):  lactated ringers. 1000 milliLiter(s) (125 mL/Hr) IV Continuous <Continuous>  docusate sodium 100 milliGRAM(s) Oral three times a day  ceFAZolin   IVPB   IV Intermittent   finasteride 5 milliGRAM(s) Oral daily  latanoprost 0.005% Ophthalmic Solution 1 Drop(s) Both EYES at bedtime  furosemide    Tablet 20 milliGRAM(s) Oral daily  metoprolol succinate ER 50 milliGRAM(s) Oral daily  atorvastatin 20 milliGRAM(s) Oral at bedtime  tamsulosin 0.8 milliGRAM(s) Oral at bedtime  lisinopril 20 milliGRAM(s) Oral daily  ceFAZolin   IVPB 1000 milliGRAM(s) IV Intermittent once  ceFAZolin   IVPB 1000 milliGRAM(s) IV Intermittent every 8 hours  aspirin enteric coated 81 milliGRAM(s) Oral daily    MEDICATIONS  (PRN):  fentaNYL    Injectable 25 MICROGram(s) IV Push every 5 minutes PRN Moderate Pain  acetaminophen   Tablet. 650 milliGRAM(s) Oral every 6 hours PRN Mild Pain (1 - 3)  ondansetron Injectable 4 milliGRAM(s) IV Push every 6 hours PRN Nausea  senna 2 Tablet(s) Oral at bedtime PRN Constipation  oxyCODONE    IR 5 milliGRAM(s) Oral every 4 hours PRN Moderate Pain (4 - 6)  oxyCODONE    IR 10 milliGRAM(s) Oral every 4 hours PRN Severe Pain (7 - 10)      Vital Signs Last 24 Hrs  T(C): 35.3 (07-10-17 @ 11:00), Max: 36.6 (07-10-17 @ 09:35)  HR: 64 (07-10-17 @ 11:00) (64 - 97)  BP: 124/72 (07-10-17 @ 11:00) (104/69 - 124/78)  RR: 14 (07-10-17 @ 11:00) (10 - 19)  SpO2: 100% (07-10-17 @ 11:00) (96% - 100%)  CAPILLARY BLOOD GLUCOSE        I&O's Summary    10 Jul 2017 07:01  -  10 Jul 2017 11:16  --------------------------------------------------------  IN: 970 mL / OUT: 0 mL / NET: 970 mL        PHYSICAL EXAM:  GENERAL: NAD, well-developed  HEAD:  Atraumatic, Normocephalic  EYES: EOMI, PERRLA, conjunctiva and sclera clear  NECK: Supple, No JVD  CHEST/LUNG: Clear to auscultation bilaterally; No wheeze  HEART: Regular rate and rhythm; No murmurs, rubs, or gallops  ABDOMEN: Soft, Nontender, Nondistended; Bowel sounds present  : florez/CBI, hematuria  EXTREMITIES:  2+ Peripheral Pulses, No clubbing, cyanosis, or edema  PSYCH: AAOx3  NEUROLOGY: non-focal  SKIN: No rashes or lesions    LABS: pending                  Microbiology     RADIOLOGY & ADDITIONAL TESTS:    Imaging Personally Reviewed:    Consultant(s) Notes Reviewed:      Care Discussed with Consultants/Other Providers: HPI:  71y/o male with h/o HTN, dyslipidemia, CAD s/p LAURA to LAD in 9/2015, obesity, Afib on xarelto, symptomatic BPH, admitted for cysto/TURP and TURBT today. Patient reports urinary retention, hesitancy and slow stream, nocturia prior to the surgery. He is seen postop s/p TURP, feels well other than a little chilly, denies chest pain/sob/palpitation. He is on CBI for postop hematuria.    PAST MEDICAL & SURGICAL HISTORY:  Benign prostatic hyperplasia  Stented coronary artery: bare metal stent, 9/2015  Osteoarthritis of right hip  CAD (coronary artery disease)  Obesity  Varicose vein of leg: right  Hip pain, right  BPH (benign prostatic hypertrophy)  Glaucoma  Atrial fibrillation  Dyslipidemia  Hypertension  S/P hip replacement: right hip replacement 5/2016  S/P tonsillectomy: childhood  S/P angioplasty with stent: 1 stent LAD 9/2015  S/P ORIF (open reduction internal fixation) fracture: right ankle   2003  S/P cholecystectomy: 2006      Review of Systems:   CONSTITUTIONAL: No fever, weight loss, or fatigue  EYES: No eye pain, visual disturbances, or discharge  ENMT:  No difficulty hearing, tinnitus, vertigo; No sinus or throat pain  NECK: No pain or stiffness  BREASTS: No pain, masses, or nipple discharge  RESPIRATORY: No cough, wheezing, chills or hemoptysis; No shortness of breath  CARDIOVASCULAR: No chest pain, palpitations, dizziness, or leg swelling  GASTROINTESTINAL: No abdominal or epigastric pain. No nausea, vomiting, or hematemesis; No diarrhea or constipation. No melena or hematochezia.  GENITOURINARY: No dysuria, frequency, +hesitancy/slow stream prior to surgery, postop hematuria  NEUROLOGICAL: No headaches, memory loss, loss of strength, numbness, or tremors  SKIN: No itching, burning, rashes, or lesions   LYMPH NODES: No enlarged glands  ENDOCRINE: No heat or cold intolerance; No hair loss  MUSCULOSKELETAL: No joint pain or swelling; No muscle, back, or extremity pain  PSYCHIATRIC: No depression, anxiety, mood swings, or difficulty sleeping  HEME/LYMPH: No easy bruising, or bleeding gums  ALLERY AND IMMUNOLOGIC: No hives or eczema    Allergies No Known Allergies    Intolerances    Social History: former smoker 8 pack years, quit in 1969, denies drug abuse; drinks beer 1-2 drinks 2-3x/wk    FAMILY HISTORY:  Family history of colon cancer (Father)  Family history of aortic aneurysm (Mother)    Home medications    · 	Travatan Z 0.004% ophthalmic solution: 1 drop(s) to each affected eye once a day (in the evening)  · 	Vitamin C 1000 mg oral tablet:  orally 2 times a day  · 	Colace 100 mg oral capsule:  orally 2 times a day  · 	aspirin 81 mg oral tablet: Last Dose Taken:  , 1 tab(s) orally once a day in am  · 	atorvastatin 20 mg oral tablet: Last Dose Taken:  , 1 tab(s) orally once a day in pm  · 	benazepril 20 mg oral tablet: Last Dose Taken:  , 1 tab(s) orally once a day in am  · 	Calcium 600+D oral tablet: Last Dose Taken:  , 1 tab(s) orally once a day in am  · 	finasteride 5 mg oral tablet: Last Dose Taken:  , 1 tab(s) orally once a day in pm  · 	furosemide 20 mg oral tablet: Last Dose Taken:  , 1 tab(s) orally once a day in am  · 	multivitamin: Last Dose Taken:  ,   once a day in am last dose 7/2/17  · 	tamsulosin 0.4 mg oral capsule: Last Dose Taken:  , 2 cap(s) orally once a day in pm  · 	Flax Seed Oil oral capsule: Last Dose Taken:  , 2  orally 2 times a day last dose 7/2/17  · 	Vitamin D: Last Dose Taken:  , 400 international unit(s) orally once a day in am  · 	Claritin 10 mg oral tablet: Last Dose Taken:  , 1 tab(s) orally once a day in am  · 	Metoprolol Succinate ER 50 mg oral tablet, extended release: Last Dose Taken:  , 1 tab(s) orally once a day in pm  · 	Xarelto 20 mg oral tablet: Last Dose Taken:  , 1 tab(s) orally once a day (in the evening) last dose 7/4/17    MEDICATIONS  (STANDING):  lactated ringers. 1000 milliLiter(s) (125 mL/Hr) IV Continuous <Continuous>  docusate sodium 100 milliGRAM(s) Oral three times a day  ceFAZolin   IVPB   IV Intermittent   finasteride 5 milliGRAM(s) Oral daily  latanoprost 0.005% Ophthalmic Solution 1 Drop(s) Both EYES at bedtime  furosemide    Tablet 20 milliGRAM(s) Oral daily  metoprolol succinate ER 50 milliGRAM(s) Oral daily  atorvastatin 20 milliGRAM(s) Oral at bedtime  tamsulosin 0.8 milliGRAM(s) Oral at bedtime  lisinopril 20 milliGRAM(s) Oral daily  ceFAZolin   IVPB 1000 milliGRAM(s) IV Intermittent once  ceFAZolin   IVPB 1000 milliGRAM(s) IV Intermittent every 8 hours  aspirin enteric coated 81 milliGRAM(s) Oral daily    MEDICATIONS  (PRN):  fentaNYL    Injectable 25 MICROGram(s) IV Push every 5 minutes PRN Moderate Pain  acetaminophen   Tablet. 650 milliGRAM(s) Oral every 6 hours PRN Mild Pain (1 - 3)  ondansetron Injectable 4 milliGRAM(s) IV Push every 6 hours PRN Nausea  senna 2 Tablet(s) Oral at bedtime PRN Constipation  oxyCODONE    IR 5 milliGRAM(s) Oral every 4 hours PRN Moderate Pain (4 - 6)  oxyCODONE    IR 10 milliGRAM(s) Oral every 4 hours PRN Severe Pain (7 - 10)      Vital Signs Last 24 Hrs  T(C): 35.3 (07-10-17 @ 11:00), Max: 36.6 (07-10-17 @ 09:35)  HR: 64 (07-10-17 @ 11:00) (64 - 97)  BP: 124/72 (07-10-17 @ 11:00) (104/69 - 124/78)  RR: 14 (07-10-17 @ 11:00) (10 - 19)  SpO2: 100% (07-10-17 @ 11:00) (96% - 100%)  CAPILLARY BLOOD GLUCOSE        I&O's Summary    10 Jul 2017 07:01  -  10 Jul 2017 11:16  --------------------------------------------------------  IN: 970 mL / OUT: 0 mL / NET: 970 mL        PHYSICAL EXAM:  GENERAL: NAD, well-developed  HEAD:  Atraumatic, Normocephalic  EYES: EOMI, PERRLA, conjunctiva and sclera clear  NECK: Supple, No JVD  CHEST/LUNG: Clear to auscultation bilaterally; No wheeze  HEART: Regular rate and rhythm; No murmurs, rubs, or gallops  ABDOMEN: Soft, Nontender, Nondistended; Bowel sounds present  : florez/CBI, hematuria  EXTREMITIES:  2+ Peripheral Pulses, No clubbing, cyanosis, 1-2+ b/l leg edema  PSYCH: AAOx3  NEUROLOGY: non-focal  SKIN: No rashes or lesions    LABS: pending                  Microbiology     RADIOLOGY & ADDITIONAL TESTS:    Imaging Personally Reviewed:    Consultant(s) Notes Reviewed:      Care Discussed with Consultants/Other Providers:

## 2017-07-10 NOTE — CONSULT NOTE ADULT - ASSESSMENT
71y/o male with h/o HTN, dyslipidemia, CAD s/p LAURA to LAD in 9/2015, obesity, Afib on xarelto, symptomatic BPH, admitted for cysto/TURP and TURBT on 7/10

## 2017-07-11 ENCOUNTER — TRANSCRIPTION ENCOUNTER (OUTPATIENT)
Age: 72
End: 2017-07-11

## 2017-07-11 VITALS
RESPIRATION RATE: 17 BRPM | TEMPERATURE: 99 F | DIASTOLIC BLOOD PRESSURE: 58 MMHG | SYSTOLIC BLOOD PRESSURE: 111 MMHG | OXYGEN SATURATION: 97 % | HEART RATE: 68 BPM

## 2017-07-11 PROCEDURE — 99232 SBSQ HOSP IP/OBS MODERATE 35: CPT

## 2017-07-11 RX ORDER — LISINOPRIL 2.5 MG/1
1 TABLET ORAL
Qty: 0 | Refills: 0 | DISCHARGE
Start: 2017-07-11

## 2017-07-11 RX ORDER — CEPHALEXIN 500 MG
1 CAPSULE ORAL
Qty: 12 | Refills: 0
Start: 2017-07-11 | End: 2017-07-14

## 2017-07-11 RX ORDER — DOCUSATE SODIUM 100 MG
0 CAPSULE ORAL
Qty: 0 | Refills: 0 | COMMUNITY

## 2017-07-11 RX ADMIN — SODIUM CHLORIDE 125 MILLILITER(S): 9 INJECTION, SOLUTION INTRAVENOUS at 06:26

## 2017-07-11 RX ADMIN — Medication 100 MILLIGRAM(S): at 06:26

## 2017-07-11 RX ADMIN — Medication 20 MILLIGRAM(S): at 06:26

## 2017-07-11 RX ADMIN — Medication 50 MILLIGRAM(S): at 06:26

## 2017-07-11 RX ADMIN — FINASTERIDE 5 MILLIGRAM(S): 5 TABLET, FILM COATED ORAL at 12:34

## 2017-07-11 RX ADMIN — LISINOPRIL 20 MILLIGRAM(S): 2.5 TABLET ORAL at 06:26

## 2017-07-11 RX ADMIN — Medication 81 MILLIGRAM(S): at 12:34

## 2017-07-11 NOTE — PROGRESS NOTE ADULT - PROBLEM SELECTOR PROBLEM 1
Benign prostatic hyperplasia with lower urinary tract symptoms, unspecified morphology

## 2017-07-11 NOTE — DISCHARGE NOTE ADULT - CARE PROVIDER_API CALL
Darrick Rawls), Urology  [address] [place] [id number]  Phone: [phone]  Fax: [phone]

## 2017-07-11 NOTE — DISCHARGE NOTE ADULT - CARE PLAN
Principal Discharge DX:	Benign prostatic hyperplasia  Goal:	void well  Instructions for follow-up, activity and diet:	as above; drink plenty of fluids; call office for fever over 101 or bright red urine; no exercise or lifting

## 2017-07-11 NOTE — DISCHARGE NOTE ADULT - MEDICATION SUMMARY - MEDICATIONS TO TAKE
I will START or STAY ON the medications listed below when I get home from the hospital:    Vitamin D  -- 400 international unit(s) by mouth once a day in am  -- Indication: For Home med    finasteride 5 mg oral tablet  -- 1 tab(s) by mouth once a day in pm  -- Indication: For Home med    aspirin 81 mg oral tablet  -- 1 tab(s) by mouth once a day in am  -- Indication: For Home med    benazepril 20 mg oral tablet  -- 1 tab(s) by mouth once a day in am  -- Indication: For Home med    lisinopril 20 mg oral tablet  -- 1 tab(s) by mouth once a day  -- Indication: For Home med    tamsulosin 0.4 mg oral capsule  -- 2 cap(s) by mouth once a day in pm  -- Indication: For Home med    Xarelto 20 mg oral tablet  -- 1 tab(s) by mouth once a day (in the evening) last dose 7/4/17  -- Indication: For re-start Friday    Claritin 10 mg oral tablet  -- 1 tab(s) by mouth once a day in am  -- Indication: For Home med    atorvastatin 20 mg oral tablet  -- 1 tab(s) by mouth once a day in pm  -- Indication: For Home med    Metoprolol Succinate ER 50 mg oral tablet, extended release  -- 1 tab(s) by mouth once a day in pm  -- Indication: For Home med    Keflex 500 mg oral capsule  -- 1 cap(s) by mouth 4 times a day  -- Finish all this medication unless otherwise directed by prescriber.    -- Indication: For Antibiotic    furosemide 20 mg oral tablet  -- 1 tab(s) by mouth once a day in am  -- Indication: For Home med    Flax Seed Oil oral capsule  -- 2  by mouth 2 times a day last dose 7/2/17  -- Indication: For Home med    Travatan Z 0.004% ophthalmic solution  -- 1 drop(s) to each affected eye once a day (in the evening)  -- Indication: For Home med    Calcium 600+D oral tablet  -- 1 tab(s) by mouth once a day in am  -- Indication: For Home med    multivitamin  --   once a day in am last dose 7/2/17  -- Indication: For Home med    Vitamin C 1000 mg oral tablet  --  by mouth 2 times a day  -- Indication: For Home med

## 2017-07-11 NOTE — PROGRESS NOTE ADULT - SUBJECTIVE AND OBJECTIVE BOX
POD #1    Afeb 111/59 91 98%RA    Pt has no c/o    Abd- soft NT ND     Harris/CBI off 2 hrs; urine pink

## 2017-07-11 NOTE — DISCHARGE NOTE ADULT - CONDITIONS AT DISCHARGE
stable stable, florez catheter d/cd this am pt voiding stable, florez catheter d/cd this am pt voiding, tolerating diet, oob without difficulty, vss

## 2017-07-11 NOTE — DISCHARGE NOTE ADULT - PATIENT PORTAL LINK FT
“You can access the [facility] Patient Portal, offered by [facility], by registering with the following website: [email or web address]”

## 2017-07-11 NOTE — PROGRESS NOTE ADULT - PROBLEM SELECTOR PLAN 1
D/C florez  PVR  OOB
D/C traction  Continue CBI  Monitor color  Continue Ancef  Hold ASA today  Hold Xarelto   IVF  Pain control  Regular diet  DVT prophy, IS  OOB
s/p cysto/TURP on 7/10  progressing well postop, tolerating diet, florez removed, d/c planning as per   c/w flomax/proscar

## 2017-07-11 NOTE — DISCHARGE NOTE ADULT - HOSPITAL COURSE
Pt had TURP/BT yesterday; did well; off CBI today; urine stayed pink; took out florez and pt voided 500 cc clear pink; zero PVR; home today; xarelto in 3 days

## 2017-07-11 NOTE — DISCHARGE NOTE ADULT - INSTRUCTIONS
Notify Dr Rawls if you experience any increase in pain not relieved with pain medication.  If you devleo[pe any bright red blood or clots while urinating, or if you are unable to urinate.  Continue to drink plenty of fluids, use over the counter stool to assist with constipation which can be a side effect of your pain medication. as tolerated Notify Dr Rawls if you experience any increase in pain not relieved with pain medication.  If you develop any bright red blood or clots while urinating, or if you are unable to urinate.  Continue to drink plenty of fluids, use over the counter stool to assist with constipation.

## 2017-07-11 NOTE — PROGRESS NOTE ADULT - PROBLEM SELECTOR PLAN 3
-resumed on ASA  -preop echo 4/2017 showed normal LVEF 67%, mild AI/MR, borderline pulm HTN  -cont statin and toprol 50 mg, lasix 20 mg, ACEI.

## 2017-07-11 NOTE — DISCHARGE NOTE ADULT - PLAN OF CARE
void well as above; drink plenty of fluids; call office for fever over 101 or bright red urine; no exercise or lifting

## 2017-07-11 NOTE — PROGRESS NOTE ADULT - SUBJECTIVE AND OBJECTIVE BOX
Patient is a 72y old  Male who presents with a chief complaint of BPH (11 Jul 2017 11:19)      SUBJECTIVE / OVERNIGHT EVENTS:  Patient feels well, florez removed, denies chest pain or sob.    MEDICATIONS  (STANDING):  docusate sodium 100 milliGRAM(s) Oral three times a day  ceFAZolin   IVPB   IV Intermittent   finasteride 5 milliGRAM(s) Oral daily  latanoprost 0.005% Ophthalmic Solution 1 Drop(s) Both EYES at bedtime  furosemide    Tablet 20 milliGRAM(s) Oral daily  metoprolol succinate ER 50 milliGRAM(s) Oral daily  atorvastatin 20 milliGRAM(s) Oral at bedtime  tamsulosin 0.8 milliGRAM(s) Oral at bedtime  lisinopril 20 milliGRAM(s) Oral daily  ceFAZolin   IVPB 1000 milliGRAM(s) IV Intermittent every 8 hours  aspirin enteric coated 81 milliGRAM(s) Oral daily    MEDICATIONS  (PRN):  acetaminophen   Tablet. 650 milliGRAM(s) Oral every 6 hours PRN Mild Pain (1 - 3)  ondansetron Injectable 4 milliGRAM(s) IV Push every 6 hours PRN Nausea  senna 2 Tablet(s) Oral at bedtime PRN Constipation  oxyCODONE    IR 5 milliGRAM(s) Oral every 4 hours PRN Moderate Pain (4 - 6)  oxyCODONE    IR 10 milliGRAM(s) Oral every 4 hours PRN Severe Pain (7 - 10)      Vital Signs Last 24 Hrs  T(C): 37.2 (07-11-17 @ 09:15), Max: 37.2 (07-11-17 @ 09:15)  HR: 68 (07-11-17 @ 09:15) (68 - 91)  BP: 111/58 (07-11-17 @ 09:15) (102/66 - 112/72)  RR: 17 (07-11-17 @ 09:15) (16 - 17)  SpO2: 97% (07-11-17 @ 09:15) (97% - 99%)  CAPILLARY BLOOD GLUCOSE        I&O's Summary    10 Jul 2017 07:01  -  11 Jul 2017 07:00  --------------------------------------------------------  IN: 7220 mL / OUT: 6700 mL / NET: 520 mL    11 Jul 2017 07:01  -  11 Jul 2017 13:13  --------------------------------------------------------  IN: 0 mL / OUT: 650 mL / NET: -650 mL        PHYSICAL EXAM:  GENERAL: NAD, well-developed  HEAD:  Atraumatic, Normocephalic  EYES: EOMI, PERRLA, conjunctiva and sclera clear  NECK: Supple, No JVD  CHEST/LUNG: Clear to auscultation bilaterally; No wheeze  HEART: Regular rate and rhythm; No murmurs, rubs, or gallops  ABDOMEN: Soft, Nontender, Nondistended; Bowel sounds present  EXTREMITIES:  2+ Peripheral Pulses, No clubbing, cyanosis, or edema  PSYCH: AAOx3  NEUROLOGY: non-focal  SKIN: No rashes or lesions    LABS: no new lab    [facility] 07-10 @ 08:54  pH: 7.35/pCO2: 50/pO2: 53/HCO3: 25/lactate: --    Microbiology:     RADIOLOGY & ADDITIONAL TESTS:    Imaging Personally Reviewed:    Consultant(s) Notes Reviewed:      Care Discussed with Consultants/Other Providers: COY Evans, resumed on ASA, resume xarelto when cleared by urology

## 2017-07-11 NOTE — PROGRESS NOTE ADULT - ASSESSMENT
73y/o male with h/o HTN, dyslipidemia, CAD s/p LAURA to LAD in 9/2015, obesity, Afib on xarelto, symptomatic BPH, admitted for cysto/TURP  on 7/10

## 2017-07-12 ENCOUNTER — TRANSCRIPTION ENCOUNTER (OUTPATIENT)
Age: 72
End: 2017-07-12

## 2017-07-12 ENCOUNTER — MESSAGE (OUTPATIENT)
Age: 72
End: 2017-07-12

## 2017-07-12 LAB — SURGICAL PATHOLOGY STUDY: SIGNIFICANT CHANGE UP

## 2017-09-06 ENCOUNTER — OTHER (OUTPATIENT)
Age: 72
End: 2017-09-06

## 2017-11-16 ENCOUNTER — TRANSCRIPTION ENCOUNTER (OUTPATIENT)
Age: 72
End: 2017-11-16

## 2018-01-09 ENCOUNTER — OUTPATIENT (OUTPATIENT)
Dept: OUTPATIENT SERVICES | Facility: HOSPITAL | Age: 73
LOS: 1 days | End: 2018-01-09
Payer: MEDICARE

## 2018-01-09 ENCOUNTER — APPOINTMENT (OUTPATIENT)
Dept: UROLOGY | Facility: CLINIC | Age: 73
End: 2018-01-09
Payer: MEDICARE

## 2018-01-09 VITALS
HEART RATE: 91 BPM | TEMPERATURE: 97.6 F | SYSTOLIC BLOOD PRESSURE: 103 MMHG | DIASTOLIC BLOOD PRESSURE: 69 MMHG | RESPIRATION RATE: 16 BRPM

## 2018-01-09 DIAGNOSIS — N39.41 URGE INCONTINENCE: ICD-10-CM

## 2018-01-09 DIAGNOSIS — N40.1 BENIGN PROSTATIC HYPERPLASIA WITH LOWER URINARY TRACT SYMPMS: ICD-10-CM

## 2018-01-09 DIAGNOSIS — R35.0 FREQUENCY OF MICTURITION: ICD-10-CM

## 2018-01-09 DIAGNOSIS — Z90.49 ACQUIRED ABSENCE OF OTHER SPECIFIED PARTS OF DIGESTIVE TRACT: Chronic | ICD-10-CM

## 2018-01-09 DIAGNOSIS — Z98.89 OTHER SPECIFIED POSTPROCEDURAL STATES: Chronic | ICD-10-CM

## 2018-01-09 DIAGNOSIS — Z90.89 ACQUIRED ABSENCE OF OTHER ORGANS: Chronic | ICD-10-CM

## 2018-01-09 DIAGNOSIS — Z96.7 PRESENCE OF OTHER BONE AND TENDON IMPLANTS: Chronic | ICD-10-CM

## 2018-01-09 DIAGNOSIS — Z96.649 PRESENCE OF UNSPECIFIED ARTIFICIAL HIP JOINT: Chronic | ICD-10-CM

## 2018-01-09 PROCEDURE — 52000 CYSTOURETHROSCOPY: CPT

## 2018-01-09 RX ORDER — OXYBUTYNIN CHLORIDE 10 MG/1
10 TABLET, EXTENDED RELEASE ORAL DAILY
Qty: 90 | Refills: 3 | Status: COMPLETED | COMMUNITY
Start: 2017-09-06 | End: 2018-01-09

## 2018-01-12 LAB — CORE LAB FLUID CYTOLOGY: NORMAL

## 2018-01-17 DIAGNOSIS — Z85.51 PERSONAL HISTORY OF MALIGNANT NEOPLASM OF BLADDER: ICD-10-CM

## 2018-01-17 DIAGNOSIS — N40.1 BENIGN PROSTATIC HYPERPLASIA WITH LOWER URINARY TRACT SYMPTOMS: ICD-10-CM

## 2018-01-31 NOTE — CONSULT NOTE ADULT - PROBLEM SELECTOR PROBLEM 6
Here with approximately 1 month of constipation. Stooling once every 7 or 8 days. The stool passes normal not very hard. It seems darker but not black. She is having lower abdominal pain. Not cramps started out dull but becoming more sharp.   She denie Positive bowel sounds. Assessment constipation. I suspect this is secondary to the Cymbalta. Reviewing drugs. com suggest greater than 10% of patients develop constipation and 1-10% of patients develop abdominal pain.     Plans I would like to wean her Postoperative anemia due to acute blood loss

## 2018-08-17 PROBLEM — N40.0 BENIGN PROSTATIC HYPERPLASIA WITHOUT LOWER URINARY TRACT SYMPTOMS: Chronic | Status: ACTIVE | Noted: 2017-06-05

## 2018-08-23 ENCOUNTER — APPOINTMENT (OUTPATIENT)
Dept: ORTHOPEDIC SURGERY | Facility: CLINIC | Age: 73
End: 2018-08-23
Payer: MEDICARE

## 2018-08-23 VITALS
HEART RATE: 84 BPM | BODY MASS INDEX: 35.68 KG/M2 | HEIGHT: 76 IN | DIASTOLIC BLOOD PRESSURE: 73 MMHG | SYSTOLIC BLOOD PRESSURE: 108 MMHG | WEIGHT: 293 LBS

## 2018-08-23 DIAGNOSIS — M17.12 UNILATERAL PRIMARY OSTEOARTHRITIS, LEFT KNEE: ICD-10-CM

## 2018-08-23 PROCEDURE — 99214 OFFICE O/P EST MOD 30 MIN: CPT | Mod: 25

## 2018-08-23 PROCEDURE — 20610 DRAIN/INJ JOINT/BURSA W/O US: CPT | Mod: LT

## 2018-08-23 PROCEDURE — 73562 X-RAY EXAM OF KNEE 3: CPT | Mod: LT

## 2018-08-25 PROBLEM — M17.12 ARTHRITIS OF KNEE, LEFT: Status: ACTIVE | Noted: 2018-08-25

## 2021-05-27 ENCOUNTER — APPOINTMENT (OUTPATIENT)
Dept: UROLOGY | Facility: CLINIC | Age: 76
End: 2021-05-27
Payer: MEDICARE

## 2021-05-27 ENCOUNTER — OUTPATIENT (OUTPATIENT)
Dept: OUTPATIENT SERVICES | Facility: HOSPITAL | Age: 76
LOS: 1 days | End: 2021-05-27
Payer: MEDICARE

## 2021-05-27 VITALS
RESPIRATION RATE: 16 BRPM | SYSTOLIC BLOOD PRESSURE: 128 MMHG | TEMPERATURE: 98.6 F | HEIGHT: 76 IN | WEIGHT: 293 LBS | HEART RATE: 84 BPM | DIASTOLIC BLOOD PRESSURE: 85 MMHG | BODY MASS INDEX: 35.68 KG/M2

## 2021-05-27 DIAGNOSIS — Z80.0 FAMILY HISTORY OF MALIGNANT NEOPLASM OF DIGESTIVE ORGANS: ICD-10-CM

## 2021-05-27 DIAGNOSIS — Z78.9 OTHER SPECIFIED HEALTH STATUS: ICD-10-CM

## 2021-05-27 DIAGNOSIS — Z85.51 PERSONAL HISTORY OF MALIGNANT NEOPLASM OF BLADDER: ICD-10-CM

## 2021-05-27 DIAGNOSIS — Z90.89 ACQUIRED ABSENCE OF OTHER ORGANS: Chronic | ICD-10-CM

## 2021-05-27 DIAGNOSIS — Z90.49 ACQUIRED ABSENCE OF OTHER SPECIFIED PARTS OF DIGESTIVE TRACT: Chronic | ICD-10-CM

## 2021-05-27 DIAGNOSIS — Z87.891 PERSONAL HISTORY OF NICOTINE DEPENDENCE: ICD-10-CM

## 2021-05-27 DIAGNOSIS — Z86.79 PERSONAL HISTORY OF OTHER DISEASES OF THE CIRCULATORY SYSTEM: ICD-10-CM

## 2021-05-27 DIAGNOSIS — Z96.649 PRESENCE OF UNSPECIFIED ARTIFICIAL HIP JOINT: Chronic | ICD-10-CM

## 2021-05-27 DIAGNOSIS — Z98.89 OTHER SPECIFIED POSTPROCEDURAL STATES: Chronic | ICD-10-CM

## 2021-05-27 DIAGNOSIS — Z96.7 PRESENCE OF OTHER BONE AND TENDON IMPLANTS: Chronic | ICD-10-CM

## 2021-05-27 PROCEDURE — 52000 CYSTOURETHROSCOPY: CPT

## 2021-05-27 PROCEDURE — 99072 ADDL SUPL MATRL&STAF TM PHE: CPT

## 2021-05-27 PROCEDURE — 99204 OFFICE O/P NEW MOD 45 MIN: CPT | Mod: 25

## 2021-05-27 RX ORDER — METFORMIN HYDROCHLORIDE 500 MG/1
500 TABLET, COATED ORAL
Refills: 0 | Status: ACTIVE | COMMUNITY

## 2021-05-27 RX ORDER — DOCUSATE SODIUM 100 MG/1
100 CAPSULE ORAL
Refills: 0 | Status: ACTIVE | COMMUNITY

## 2021-05-27 RX ORDER — SILDENAFIL 100 MG/1
100 TABLET, FILM COATED ORAL
Refills: 0 | Status: ACTIVE | COMMUNITY

## 2021-05-27 RX ORDER — BENAZEPRIL HYDROCHLORIDE 20 MG/1
20 TABLET, FILM COATED ORAL
Refills: 0 | Status: ACTIVE | COMMUNITY

## 2021-05-27 RX ORDER — LORATADINE 10 MG/1
TABLET ORAL
Refills: 0 | Status: ACTIVE | COMMUNITY

## 2021-05-27 RX ORDER — CHROMIUM 200 MCG
TABLET ORAL
Refills: 0 | Status: ACTIVE | COMMUNITY

## 2021-05-27 RX ORDER — FLAXSEED OIL 1000 MG
1000 CAPSULE ORAL
Refills: 0 | Status: ACTIVE | COMMUNITY

## 2021-05-27 RX ORDER — ASCORBIC ACID 500 MG
500 TABLET ORAL
Refills: 0 | Status: ACTIVE | COMMUNITY

## 2021-05-27 RX ORDER — ASPIRIN 81 MG/1
81 TABLET, CHEWABLE ORAL
Refills: 0 | Status: ACTIVE | COMMUNITY

## 2021-05-27 RX ORDER — RIVAROXABAN 20 MG/1
20 TABLET, FILM COATED ORAL
Refills: 0 | Status: ACTIVE | COMMUNITY

## 2021-05-27 RX ORDER — FUROSEMIDE 20 MG/1
20 TABLET ORAL
Refills: 0 | Status: ACTIVE | COMMUNITY

## 2021-05-27 RX ORDER — LATANOPROST/PF 0.005 %
0.01 DROPS OPHTHALMIC (EYE)
Refills: 0 | Status: ACTIVE | COMMUNITY

## 2021-05-27 NOTE — HISTORY OF PRESENT ILLNESS
[FreeTextEntry1] : Patient is a 76 year old man with a history of BPH and bladder cancer. \par S/P TURP and resection of small bladder tumor 2017\par PATH: non-invasive  low grade papillary urothelial carcinoma.\par \par No follow up since that time as the patient was not living in [place].\par \par Reports he noticed gross hematuria 3 weeks ago. Lasted for about 6 hours. He reports being constipated the day before.  Reports urinary stream is strong, feels that he is emptying his bladder. Denies any increased urgency or frequency. Denies dysuria. Nocturia x 1. \par \par Former smoker.\par \par Review of labs done recently shows normal serum creatinine, normal serum PSA 0.98 ng/mL.

## 2021-05-27 NOTE — ASSESSMENT
[FreeTextEntry1] : Reviewed potential etiology of gross hematuria including prostate regrowth, bladder or ureter malignancy, urinary tract stones, benign urothelial neoplasms.  We will plan for office cystoscopy today for further evaluation.\par \par UA, culture, cytology sent.\par \par Office cystoscopy was performed today which showed a papillary tumor occupying the left trigone and posterior lateral wall.  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  Recommend he proceed with outpatient surgery for cystoscopy, transduction of bladder tumor, fulguration.  Patient will undergo a CT urogram prior to surgery to evaluate the upper urinary tracts.

## 2021-05-27 NOTE — REVIEW OF SYSTEMS
[Eyesight Problems] : eyesight problems [Heart Rate Is Slow] : slow heart rate [Poor quality erections] : Poor quality erections [Blood in urine that you can see] : blood visible in urine [Joint Swelling] : joint swelling [Easy Bleeding] : a tendency for easy bleeding [see HPI] : see HPI [Negative] : Heme/Lymph

## 2021-05-28 LAB
APPEARANCE: CLEAR
BACTERIA UR CULT: NORMAL
BACTERIA: NEGATIVE
BILIRUBIN URINE: NEGATIVE
BLOOD URINE: NEGATIVE
COLOR: NORMAL
GLUCOSE QUALITATIVE U: NEGATIVE
HYALINE CASTS: 0 /LPF
KETONES URINE: NEGATIVE
LEUKOCYTE ESTERASE URINE: NEGATIVE
MICROSCOPIC-UA: NORMAL
NITRITE URINE: NEGATIVE
PH URINE: 6.5
PROTEIN URINE: NEGATIVE
RED BLOOD CELLS URINE: 0 /HPF
SPECIFIC GRAVITY URINE: 1.01
SQUAMOUS EPITHELIAL CELLS: 0 /HPF
UROBILINOGEN URINE: NORMAL
WHITE BLOOD CELLS URINE: 0 /HPF

## 2021-06-01 LAB — URINE CYTOLOGY: NORMAL

## 2021-06-14 ENCOUNTER — OUTPATIENT (OUTPATIENT)
Dept: OUTPATIENT SERVICES | Facility: HOSPITAL | Age: 76
LOS: 1 days | End: 2021-06-14
Payer: MEDICARE

## 2021-06-14 ENCOUNTER — APPOINTMENT (OUTPATIENT)
Dept: CT IMAGING | Facility: IMAGING CENTER | Age: 76
End: 2021-06-14
Payer: MEDICARE

## 2021-06-14 DIAGNOSIS — Z96.649 PRESENCE OF UNSPECIFIED ARTIFICIAL HIP JOINT: Chronic | ICD-10-CM

## 2021-06-14 DIAGNOSIS — Z90.89 ACQUIRED ABSENCE OF OTHER ORGANS: Chronic | ICD-10-CM

## 2021-06-14 DIAGNOSIS — Z90.49 ACQUIRED ABSENCE OF OTHER SPECIFIED PARTS OF DIGESTIVE TRACT: Chronic | ICD-10-CM

## 2021-06-14 DIAGNOSIS — Z96.7 PRESENCE OF OTHER BONE AND TENDON IMPLANTS: Chronic | ICD-10-CM

## 2021-06-14 DIAGNOSIS — Z00.8 ENCOUNTER FOR OTHER GENERAL EXAMINATION: ICD-10-CM

## 2021-06-14 DIAGNOSIS — R31.0 GROSS HEMATURIA: ICD-10-CM

## 2021-06-14 DIAGNOSIS — Z98.89 OTHER SPECIFIED POSTPROCEDURAL STATES: Chronic | ICD-10-CM

## 2021-06-14 PROCEDURE — 74178 CT ABD&PLV WO CNTR FLWD CNTR: CPT | Mod: 26

## 2021-06-14 PROCEDURE — 74178 CT ABD&PLV WO CNTR FLWD CNTR: CPT

## 2021-06-22 DIAGNOSIS — R31.0 GROSS HEMATURIA: ICD-10-CM

## 2021-06-23 DIAGNOSIS — Z85.51 PERSONAL HISTORY OF MALIGNANT NEOPLASM OF BLADDER: ICD-10-CM

## 2021-06-23 DIAGNOSIS — R31.0 GROSS HEMATURIA: ICD-10-CM

## 2021-06-23 DIAGNOSIS — C67.8 MALIGNANT NEOPLASM OF OVERLAPPING SITES OF BLADDER: ICD-10-CM

## 2021-09-03 ENCOUNTER — APPOINTMENT (OUTPATIENT)
Dept: UROLOGY | Facility: HOSPITAL | Age: 76
End: 2021-09-03

## 2021-09-07 ENCOUNTER — OUTPATIENT (OUTPATIENT)
Dept: OUTPATIENT SERVICES | Facility: HOSPITAL | Age: 76
LOS: 1 days | End: 2021-09-07
Payer: MEDICARE

## 2021-09-07 VITALS
DIASTOLIC BLOOD PRESSURE: 74 MMHG | SYSTOLIC BLOOD PRESSURE: 130 MMHG | RESPIRATION RATE: 16 BRPM | WEIGHT: 283.96 LBS | OXYGEN SATURATION: 99 % | HEIGHT: 74.5 IN | HEART RATE: 71 BPM | TEMPERATURE: 97 F

## 2021-09-07 DIAGNOSIS — R31.0 GROSS HEMATURIA: ICD-10-CM

## 2021-09-07 DIAGNOSIS — Z96.7 PRESENCE OF OTHER BONE AND TENDON IMPLANTS: Chronic | ICD-10-CM

## 2021-09-07 DIAGNOSIS — Z90.49 ACQUIRED ABSENCE OF OTHER SPECIFIED PARTS OF DIGESTIVE TRACT: Chronic | ICD-10-CM

## 2021-09-07 DIAGNOSIS — Z98.89 OTHER SPECIFIED POSTPROCEDURAL STATES: Chronic | ICD-10-CM

## 2021-09-07 DIAGNOSIS — Z90.89 ACQUIRED ABSENCE OF OTHER ORGANS: Chronic | ICD-10-CM

## 2021-09-07 DIAGNOSIS — Z96.649 PRESENCE OF UNSPECIFIED ARTIFICIAL HIP JOINT: Chronic | ICD-10-CM

## 2021-09-07 LAB
A1C WITH ESTIMATED AVERAGE GLUCOSE RESULT: 7 % — HIGH (ref 4–5.6)
ANION GAP SERPL CALC-SCNC: 13 MMOL/L — SIGNIFICANT CHANGE UP (ref 7–14)
BUN SERPL-MCNC: 19 MG/DL — SIGNIFICANT CHANGE UP (ref 7–23)
CALCIUM SERPL-MCNC: 9.9 MG/DL — SIGNIFICANT CHANGE UP (ref 8.4–10.5)
CHLORIDE SERPL-SCNC: 98 MMOL/L — SIGNIFICANT CHANGE UP (ref 98–107)
CO2 SERPL-SCNC: 27 MMOL/L — SIGNIFICANT CHANGE UP (ref 22–31)
CREAT SERPL-MCNC: 1.03 MG/DL — SIGNIFICANT CHANGE UP (ref 0.5–1.3)
ESTIMATED AVERAGE GLUCOSE: 154 — SIGNIFICANT CHANGE UP
GLUCOSE SERPL-MCNC: 167 MG/DL — HIGH (ref 70–99)
HCT VFR BLD CALC: 50.9 % — HIGH (ref 39–50)
HGB BLD-MCNC: 17.5 G/DL — HIGH (ref 13–17)
MCHC RBC-ENTMCNC: 32.5 PG — SIGNIFICANT CHANGE UP (ref 27–34)
MCHC RBC-ENTMCNC: 34.4 GM/DL — SIGNIFICANT CHANGE UP (ref 32–36)
MCV RBC AUTO: 94.6 FL — SIGNIFICANT CHANGE UP (ref 80–100)
NRBC # BLD: 0 /100 WBCS — SIGNIFICANT CHANGE UP
NRBC # FLD: 0 K/UL — SIGNIFICANT CHANGE UP
PLATELET # BLD AUTO: 221 K/UL — SIGNIFICANT CHANGE UP (ref 150–400)
POTASSIUM SERPL-MCNC: 4.1 MMOL/L — SIGNIFICANT CHANGE UP (ref 3.5–5.3)
POTASSIUM SERPL-SCNC: 4.1 MMOL/L — SIGNIFICANT CHANGE UP (ref 3.5–5.3)
RBC # BLD: 5.38 M/UL — SIGNIFICANT CHANGE UP (ref 4.2–5.8)
RBC # FLD: 12.5 % — SIGNIFICANT CHANGE UP (ref 10.3–14.5)
SODIUM SERPL-SCNC: 138 MMOL/L — SIGNIFICANT CHANGE UP (ref 135–145)
WBC # BLD: 8.05 K/UL — SIGNIFICANT CHANGE UP (ref 3.8–10.5)
WBC # FLD AUTO: 8.05 K/UL — SIGNIFICANT CHANGE UP (ref 3.8–10.5)

## 2021-09-07 PROCEDURE — 93010 ELECTROCARDIOGRAM REPORT: CPT

## 2021-09-07 RX ORDER — FINASTERIDE 5 MG/1
1 TABLET, FILM COATED ORAL
Qty: 0 | Refills: 0 | DISCHARGE

## 2021-09-07 RX ORDER — TAMSULOSIN HYDROCHLORIDE 0.4 MG/1
2 CAPSULE ORAL
Qty: 0 | Refills: 0 | DISCHARGE

## 2021-09-07 RX ORDER — LORATADINE 10 MG/1
1 TABLET ORAL
Qty: 0 | Refills: 0 | DISCHARGE

## 2021-09-07 RX ORDER — ASCORBIC ACID 60 MG
0 TABLET,CHEWABLE ORAL
Qty: 0 | Refills: 0 | DISCHARGE

## 2021-09-07 RX ORDER — METOPROLOL TARTRATE 50 MG
1 TABLET ORAL
Qty: 0 | Refills: 0 | DISCHARGE

## 2021-09-07 NOTE — H&P PST ADULT - NSCAFFEAMTFREQ_GEN_ALL_CORE_SD
Reviewed labs with Dr Abebe. CBC is now normal. Per Dr. Abebe, cancel consult.    Call to patient to notify. Left message for patient to call back    Routing to ordering provider as FYI       3-4 cups/cans per day

## 2021-09-07 NOTE — H&P PST ADULT - NSICDXPASTMEDICALHX_GEN_ALL_CORE_FT
PAST MEDICAL HISTORY:  Atrial fibrillation     Benign prostatic hyperplasia     BPH (benign prostatic hypertrophy)     CAD (coronary artery disease) 1 stent - 2015    DM (diabetes mellitus)     Dyslipidemia     Glaucoma     Gross hematuria     Hip pain, right     Hypertension     Lumbar disc disorder old injury    Obesity     Obesity     Osteoarthritis of right hip     Stented coronary artery bare metal stent, 9/2015    Varicose vein of leg right     PAST MEDICAL HISTORY:  Atrial fibrillation     Benign prostatic hyperplasia     BPH (benign prostatic hypertrophy)     CAD (coronary artery disease) 1 stent - 2015    DM (diabetes mellitus)     Dyslipidemia     Glaucoma     Gross hematuria     Hearing loss     Hip pain, right     Hypertension     Lumbar disc disorder old injury    Obesity     Obesity     Osteoarthritis of right hip     Stented coronary artery bare metal stent, 9/2015    Varicose vein of leg right

## 2021-09-07 NOTE — H&P PST ADULT - PROBLEM SELECTOR PLAN 1
Pt scheduled for surgery for Cysto Transurethral Resection of Bladder Tumor with Dr Rawls tentatively 9/22/21 -and preop instructions including instructions for taking Famotidine on the day of surgery, given verbally and with use of  written materials, and patient confirming understanding of such instructions using  teach back method.  Request CC from Cardio with preop Xarelto instructions ( to stop 2 days prior - 9/19/21 ) and pt to stay on ASA for stent protection   Pt to take Benazepril am DOS  Pt to take last dose of Glucophage 9/20/21 pm dose

## 2021-09-07 NOTE — H&P PST ADULT - GENITOURINARY COMMENTS
Hx BPH - surgery 2017 - Episode of hematuria 3 months ago - denies at present Hx BPH - surgery 2017 - Episode of hematuria 3 months ago - denies hematuria or pain  at present - pt had cysto and polyps noted

## 2021-09-07 NOTE — H&P PST ADULT - NS MD HP INPLANTS MED DEV
right ankle 2 screws, right hip replacement, bare metal stent - [employer] [facility] right ankle 2 screws, right hip replacement, bare metal stent - [employer] [place] - hearing aids

## 2021-09-07 NOTE — H&P PST ADULT - NSICDXFAMILYHX_GEN_ALL_CORE_FT
FAMILY HISTORY:  Father  Still living? No  Family history of colon cancer, Age at diagnosis: Age Unknown    Mother  Still living? No  Family history of aortic aneurysm, Age at diagnosis: Age Unknown

## 2021-09-07 NOTE — H&P PST ADULT - NSICDXPASTSURGICALHX_GEN_ALL_CORE_FT
PAST SURGICAL HISTORY:  S/P angioplasty with stent 1 stent LAD 9/2015    S/P cholecystectomy 2006    S/P hip replacement right hip replacement 5/2016    S/P ORIF (open reduction internal fixation) fracture right ankle   2003    S/P tonsillectomy childhood

## 2021-09-07 NOTE — H&P PST ADULT - HISTORY OF PRESENT ILLNESS
Pt is a 76 yr old male scheduled for Cysto Transurethral REsection of Bladder Tumor with Dr Rawls tentatively 9/22/21 - pt c/o of hematuria for past 2 months - pt denies pain - initial cysto noted polyps that are now to be removed. Hx HTN, A-fib/Xarelto,  CAD /1 stent placed 2015 , HLD, obesity   Pt denies COVID  Pt had COVID vaccine Moderna   Patient instructed to contact surgeon's office concerning COVID test prior to surgery

## 2021-09-07 NOTE — H&P PST ADULT - CARDIOVASCULAR COMMENTS
Hx A-fib - on Xarelto - to be stopped 2 days preop as per Cardio and surgeon - pt to stay on ASA for stent protection

## 2021-09-08 LAB
CULTURE RESULTS: SIGNIFICANT CHANGE UP
SPECIMEN SOURCE: SIGNIFICANT CHANGE UP

## 2021-09-22 ENCOUNTER — APPOINTMENT (OUTPATIENT)
Dept: UROLOGY | Facility: AMBULATORY SURGERY CENTER | Age: 76
End: 2021-09-22

## 2021-11-04 PROBLEM — R31.0 GROSS HEMATURIA: Chronic | Status: ACTIVE | Noted: 2021-09-07

## 2021-11-04 PROBLEM — H91.90 UNSPECIFIED HEARING LOSS, UNSPECIFIED EAR: Chronic | Status: ACTIVE | Noted: 2021-09-07

## 2021-11-04 PROBLEM — E11.9 TYPE 2 DIABETES MELLITUS WITHOUT COMPLICATIONS: Chronic | Status: ACTIVE | Noted: 2021-09-07

## 2021-11-04 PROBLEM — E66.9 OBESITY, UNSPECIFIED: Chronic | Status: ACTIVE | Noted: 2021-09-07

## 2021-11-04 PROBLEM — M51.9 UNSPECIFIED THORACIC, THORACOLUMBAR AND LUMBOSACRAL INTERVERTEBRAL DISC DISORDER: Chronic | Status: ACTIVE | Noted: 2021-09-07

## 2021-11-08 ENCOUNTER — TRANSCRIPTION ENCOUNTER (OUTPATIENT)
Age: 76
End: 2021-11-08

## 2021-11-10 ENCOUNTER — OUTPATIENT (OUTPATIENT)
Dept: OUTPATIENT SERVICES | Facility: HOSPITAL | Age: 76
LOS: 1 days | End: 2021-11-10

## 2021-11-10 VITALS
OXYGEN SATURATION: 97 % | TEMPERATURE: 97 F | RESPIRATION RATE: 18 BRPM | SYSTOLIC BLOOD PRESSURE: 102 MMHG | HEART RATE: 93 BPM | WEIGHT: 287.04 LBS | DIASTOLIC BLOOD PRESSURE: 68 MMHG | HEIGHT: 74 IN

## 2021-11-10 DIAGNOSIS — Z98.89 OTHER SPECIFIED POSTPROCEDURAL STATES: Chronic | ICD-10-CM

## 2021-11-10 DIAGNOSIS — I10 ESSENTIAL (PRIMARY) HYPERTENSION: ICD-10-CM

## 2021-11-10 DIAGNOSIS — Z96.7 PRESENCE OF OTHER BONE AND TENDON IMPLANTS: Chronic | ICD-10-CM

## 2021-11-10 DIAGNOSIS — I48.91 UNSPECIFIED ATRIAL FIBRILLATION: ICD-10-CM

## 2021-11-10 DIAGNOSIS — E11.9 TYPE 2 DIABETES MELLITUS WITHOUT COMPLICATIONS: ICD-10-CM

## 2021-11-10 DIAGNOSIS — C67.8 MALIGNANT NEOPLASM OF OVERLAPPING SITES OF BLADDER: ICD-10-CM

## 2021-11-10 DIAGNOSIS — Z95.5 PRESENCE OF CORONARY ANGIOPLASTY IMPLANT AND GRAFT: ICD-10-CM

## 2021-11-10 DIAGNOSIS — E66.9 OBESITY, UNSPECIFIED: ICD-10-CM

## 2021-11-10 DIAGNOSIS — Z90.89 ACQUIRED ABSENCE OF OTHER ORGANS: Chronic | ICD-10-CM

## 2021-11-10 DIAGNOSIS — Z96.649 PRESENCE OF UNSPECIFIED ARTIFICIAL HIP JOINT: Chronic | ICD-10-CM

## 2021-11-10 DIAGNOSIS — Z90.49 ACQUIRED ABSENCE OF OTHER SPECIFIED PARTS OF DIGESTIVE TRACT: Chronic | ICD-10-CM

## 2021-11-10 LAB
A1C WITH ESTIMATED AVERAGE GLUCOSE RESULT: 7.1 % — HIGH (ref 4–5.6)
ALBUMIN SERPL ELPH-MCNC: 4.5 G/DL — SIGNIFICANT CHANGE UP (ref 3.3–5)
ALP SERPL-CCNC: 74 U/L — SIGNIFICANT CHANGE UP (ref 40–120)
ALT FLD-CCNC: 26 U/L — SIGNIFICANT CHANGE UP (ref 4–41)
ANION GAP SERPL CALC-SCNC: 15 MMOL/L — HIGH (ref 7–14)
AST SERPL-CCNC: 25 U/L — SIGNIFICANT CHANGE UP (ref 4–40)
BILIRUB SERPL-MCNC: 0.9 MG/DL — SIGNIFICANT CHANGE UP (ref 0.2–1.2)
BUN SERPL-MCNC: 19 MG/DL — SIGNIFICANT CHANGE UP (ref 7–23)
CALCIUM SERPL-MCNC: 10.2 MG/DL — SIGNIFICANT CHANGE UP (ref 8.4–10.5)
CHLORIDE SERPL-SCNC: 96 MMOL/L — LOW (ref 98–107)
CO2 SERPL-SCNC: 26 MMOL/L — SIGNIFICANT CHANGE UP (ref 22–31)
CREAT SERPL-MCNC: 0.98 MG/DL — SIGNIFICANT CHANGE UP (ref 0.5–1.3)
ESTIMATED AVERAGE GLUCOSE: 157 — SIGNIFICANT CHANGE UP
GLUCOSE SERPL-MCNC: 219 MG/DL — HIGH (ref 70–99)
HCT VFR BLD CALC: 53 % — HIGH (ref 39–50)
HGB BLD-MCNC: 18.3 G/DL — HIGH (ref 13–17)
MCHC RBC-ENTMCNC: 32.4 PG — SIGNIFICANT CHANGE UP (ref 27–34)
MCHC RBC-ENTMCNC: 34.5 GM/DL — SIGNIFICANT CHANGE UP (ref 32–36)
MCV RBC AUTO: 93.8 FL — SIGNIFICANT CHANGE UP (ref 80–100)
NRBC # BLD: 0 /100 WBCS — SIGNIFICANT CHANGE UP
NRBC # FLD: 0 K/UL — SIGNIFICANT CHANGE UP
PLATELET # BLD AUTO: 235 K/UL — SIGNIFICANT CHANGE UP (ref 150–400)
POTASSIUM SERPL-MCNC: 4 MMOL/L — SIGNIFICANT CHANGE UP (ref 3.5–5.3)
POTASSIUM SERPL-SCNC: 4 MMOL/L — SIGNIFICANT CHANGE UP (ref 3.5–5.3)
PROT SERPL-MCNC: 7.8 G/DL — SIGNIFICANT CHANGE UP (ref 6–8.3)
RBC # BLD: 5.65 M/UL — SIGNIFICANT CHANGE UP (ref 4.2–5.8)
RBC # FLD: 12.5 % — SIGNIFICANT CHANGE UP (ref 10.3–14.5)
SODIUM SERPL-SCNC: 137 MMOL/L — SIGNIFICANT CHANGE UP (ref 135–145)
WBC # BLD: 9.31 K/UL — SIGNIFICANT CHANGE UP (ref 3.8–10.5)
WBC # FLD AUTO: 9.31 K/UL — SIGNIFICANT CHANGE UP (ref 3.8–10.5)

## 2021-11-10 RX ORDER — RIVAROXABAN 15 MG-20MG
1 KIT ORAL
Qty: 0 | Refills: 0 | DISCHARGE

## 2021-11-10 RX ORDER — FUROSEMIDE 40 MG
1 TABLET ORAL
Qty: 0 | Refills: 0 | DISCHARGE

## 2021-11-10 RX ORDER — ATORVASTATIN CALCIUM 80 MG/1
1 TABLET, FILM COATED ORAL
Qty: 0 | Refills: 0 | DISCHARGE

## 2021-11-10 RX ORDER — BENAZEPRIL HYDROCHLORIDE 40 MG/1
1 TABLET ORAL
Qty: 0 | Refills: 0 | DISCHARGE

## 2021-11-10 RX ORDER — ASPIRIN/CALCIUM CARB/MAGNESIUM 324 MG
1 TABLET ORAL
Qty: 0 | Refills: 0 | DISCHARGE

## 2021-11-10 RX ORDER — METFORMIN HYDROCHLORIDE 850 MG/1
1 TABLET ORAL
Qty: 0 | Refills: 0 | DISCHARGE

## 2021-11-10 NOTE — H&P PST ADULT - NEGATIVE ENMT SYMPTOMS
no sinus symptoms/no throat pain/no dysphagia no ear pain/no vertigo/no sinus symptoms/no nasal obstruction/no throat pain/no dysphagia

## 2021-11-10 NOTE — H&P PST ADULT - HISTORY OF PRESENT ILLNESS
76 yr old male scheduled for Cysto Transurethral REsection of Bladder Tumor with Dr Rawls tentatively 9/22/21 - pt c/o of hematuria for past 2 months - pt denies pain - initial cysto noted polyps that are now to be removed. Hx HTN, A-fib/Xarelto,  CAD /1 stent placed 2015 , HLD, obesity   Pt denies COVID  Pt had COVID vaccine Moderna   Patient instructed to contact surgeon's office concerning COVID test prior to surgery  76 yr old male scheduled for Cysto Transurethral Resection of Bladder Tumor with Dr Rawls tentatively 9/22/21 - pt c/o of hematuria for past 2 months - pt denies pain - initial cysto noted polyps that are now to be removed. Hx HTN, A-fib/Xarelto,  CAD /1 stent placed 2015 , HLD, obesity   Pt denies COVID  Pt had COVID vaccine Moderna   Patient instructed to contact surgeon's office concerning COVID test prior to surgery     As per pt, surgery was cancelled by pt. 76 yr old male with H/O: HTN, DM type 2, A-fib on Xarelto, CAD - S/P coronary artery stent placed in 2015 at [facility], HLD, Glaucoma, Obesity presents to PST for pre op evaluation with h/o gross hematuria . Schedule for Cystoscopy Transurethral Resection of Bladder Tumor, bladder biopsy  with Dr Sinai Vera COVID vaccine x2 received

## 2021-11-10 NOTE — H&P PST ADULT - PROBLEM SELECTOR PLAN 5
As per pt, he was instructed by cardiologist to hold Xarelto 2 days prior to surgery, last dose 11/21/2021.  Pending cardiology consultation pre op

## 2021-11-10 NOTE — H&P PST ADULT - MUSCULOSKELETAL
details… No joint pain, swelling or deformity; no limitation of movement detailed exam BLE 1+ pitting edema, discoloration/joint swelling

## 2021-11-10 NOTE — H&P PST ADULT - NSICDXPASTMEDICALHX_GEN_ALL_CORE_FT
PAST MEDICAL HISTORY:  Atrial fibrillation     Benign prostatic hyperplasia     BPH (benign prostatic hypertrophy)     CAD (coronary artery disease) 1 stent - 2015    DM (diabetes mellitus)     Dyslipidemia     Glaucoma     Gross hematuria     Hearing loss     Hip pain, right     Hypertension     Lumbar disc disorder old injury    Obesity     Obesity     Osteoarthritis of right hip     Stented coronary artery bare metal stent, 9/2015    Varicose vein of leg right

## 2021-11-10 NOTE — H&P PST ADULT - PROBLEM SELECTOR PLAN 4
Monitor BS on day of surgery.  pt instructed to hold Metformin 24 hours prior to surgery. Pt verbalized understanding.

## 2021-11-10 NOTE — H&P PST ADULT - NEGATIVE PSYCHIATRIC SYMPTOMS
no suicidal ideation/no depression/no anxiety no suicidal ideation/no depression/no anxiety/no insomnia/no memory loss

## 2021-11-10 NOTE — H&P PST ADULT - PROBLEM SELECTOR PLAN 1
Schedule for cystoscopy transurethral resection of bladder tumor, bladder biopsy tentatively on 11/24/2021. Pre op instructions, famotidine given and explained. Pt verbalized understanding.  Pt confirmed schedule appt for covid test pre op

## 2021-11-10 NOTE — H&P PST ADULT - GENITOURINARY COMMENTS
Hx BPH - surgery 2017 - Episode of hematuria 3 months ago - denies hematuria or pain  at present - pt had cysto and polyps noted

## 2021-11-12 LAB
CULTURE RESULTS: NO GROWTH — SIGNIFICANT CHANGE UP
SPECIMEN SOURCE: SIGNIFICANT CHANGE UP

## 2021-11-21 ENCOUNTER — APPOINTMENT (OUTPATIENT)
Dept: DISASTER EMERGENCY | Facility: CLINIC | Age: 76
End: 2021-11-21

## 2021-11-21 DIAGNOSIS — Z01.818 ENCOUNTER FOR OTHER PREPROCEDURAL EXAMINATION: ICD-10-CM

## 2021-11-22 LAB — SARS-COV-2 N GENE NPH QL NAA+PROBE: NOT DETECTED

## 2021-11-23 ENCOUNTER — TRANSCRIPTION ENCOUNTER (OUTPATIENT)
Age: 76
End: 2021-11-23

## 2021-11-23 VITALS
TEMPERATURE: 97 F | HEIGHT: 74 IN | WEIGHT: 287.04 LBS | HEART RATE: 68 BPM | RESPIRATION RATE: 18 BRPM | DIASTOLIC BLOOD PRESSURE: 57 MMHG | SYSTOLIC BLOOD PRESSURE: 111 MMHG | OXYGEN SATURATION: 100 %

## 2021-11-24 ENCOUNTER — RESULT REVIEW (OUTPATIENT)
Age: 76
End: 2021-11-24

## 2021-11-24 ENCOUNTER — APPOINTMENT (OUTPATIENT)
Dept: UROLOGY | Facility: AMBULATORY SURGERY CENTER | Age: 76
End: 2021-11-24

## 2021-11-24 ENCOUNTER — OUTPATIENT (OUTPATIENT)
Dept: OUTPATIENT SERVICES | Facility: HOSPITAL | Age: 76
LOS: 1 days | Discharge: HOME | End: 2021-11-24
Payer: MEDICARE

## 2021-11-24 VITALS
RESPIRATION RATE: 10 BRPM | OXYGEN SATURATION: 100 % | DIASTOLIC BLOOD PRESSURE: 65 MMHG | HEART RATE: 70 BPM | SYSTOLIC BLOOD PRESSURE: 113 MMHG | TEMPERATURE: 97 F

## 2021-11-24 DIAGNOSIS — Z96.649 PRESENCE OF UNSPECIFIED ARTIFICIAL HIP JOINT: Chronic | ICD-10-CM

## 2021-11-24 DIAGNOSIS — Z96.7 PRESENCE OF OTHER BONE AND TENDON IMPLANTS: Chronic | ICD-10-CM

## 2021-11-24 DIAGNOSIS — C67.8 MALIGNANT NEOPLASM OF OVERLAPPING SITES OF BLADDER: ICD-10-CM

## 2021-11-24 DIAGNOSIS — Z90.49 ACQUIRED ABSENCE OF OTHER SPECIFIED PARTS OF DIGESTIVE TRACT: Chronic | ICD-10-CM

## 2021-11-24 DIAGNOSIS — Z90.89 ACQUIRED ABSENCE OF OTHER ORGANS: Chronic | ICD-10-CM

## 2021-11-24 DIAGNOSIS — Z98.89 OTHER SPECIFIED POSTPROCEDURAL STATES: Chronic | ICD-10-CM

## 2021-11-24 LAB — GLUCOSE BLDC GLUCOMTR-MCNC: 186 MG/DL — HIGH (ref 70–99)

## 2021-11-24 PROCEDURE — 88307 TISSUE EXAM BY PATHOLOGIST: CPT | Mod: 26

## 2021-11-24 PROCEDURE — 52234 CYSTOSCOPY AND TREATMENT: CPT

## 2021-11-24 RX ORDER — ASCORBIC ACID 60 MG
2 TABLET,CHEWABLE ORAL
Qty: 0 | Refills: 0 | DISCHARGE

## 2021-11-24 RX ORDER — BENAZEPRIL HYDROCHLORIDE 40 MG/1
1 TABLET ORAL
Qty: 0 | Refills: 0 | DISCHARGE

## 2021-11-24 RX ORDER — CINNAMON BARK 500 MG
2 CAPSULE ORAL
Qty: 0 | Refills: 0 | DISCHARGE

## 2021-11-24 RX ORDER — ATORVASTATIN CALCIUM 80 MG/1
1 TABLET, FILM COATED ORAL
Qty: 0 | Refills: 0 | DISCHARGE

## 2021-11-24 RX ORDER — METOPROLOL TARTRATE 50 MG
1 TABLET ORAL
Qty: 0 | Refills: 0 | DISCHARGE

## 2021-11-24 RX ORDER — FUROSEMIDE 40 MG
1 TABLET ORAL
Qty: 0 | Refills: 0 | DISCHARGE

## 2021-11-24 RX ORDER — LORATADINE 10 MG/1
1 TABLET ORAL
Qty: 0 | Refills: 0 | DISCHARGE

## 2021-11-24 RX ORDER — ERGOCALCIFEROL 1.25 MG/1
2000 CAPSULE ORAL
Qty: 0 | Refills: 0 | DISCHARGE

## 2021-11-24 RX ORDER — RIVAROXABAN 15 MG-20MG
1 KIT ORAL
Qty: 0 | Refills: 0 | DISCHARGE

## 2021-11-24 RX ORDER — DOCUSATE SODIUM 100 MG
2 CAPSULE ORAL
Qty: 0 | Refills: 0 | DISCHARGE

## 2021-11-24 RX ORDER — GEMCITABINE 38 MG/ML
2 INJECTION, SOLUTION INTRAVENOUS ONCE
Refills: 0 | Status: DISCONTINUED | OUTPATIENT
Start: 2021-11-24 | End: 2021-12-08

## 2021-11-24 RX ORDER — ASPIRIN/CALCIUM CARB/MAGNESIUM 324 MG
1 TABLET ORAL
Qty: 0 | Refills: 0 | DISCHARGE

## 2021-11-24 RX ORDER — LATANOPROST 0.05 MG/ML
1 SOLUTION/ DROPS OPHTHALMIC; TOPICAL
Qty: 0 | Refills: 0 | DISCHARGE

## 2021-11-24 RX ORDER — GEMCITABINE 2 G/50ML
2 INJECTION, POWDER, LYOPHILIZED, FOR SOLUTION INTRAVENOUS
Qty: 1 | Refills: 0 | Status: ACTIVE | OUTPATIENT
Start: 2021-11-24

## 2021-11-24 RX ORDER — METFORMIN HYDROCHLORIDE 850 MG/1
1 TABLET ORAL
Qty: 0 | Refills: 0 | DISCHARGE

## 2021-11-24 NOTE — ASU DISCHARGE PLAN (ADULT/PEDIATRIC) - ASU DISCHARGE DATE/TIME
[facility] Ambulatory Consult Note    Referring Provider: Rodo Farley  Chief Complaint   Patient presents with   • Hematuria     microhematuria         Subjective:  Fabienne Dexter is a 45 year old female seen today at the request of Rodo Farley with a chief complaint of microhematuria.  Per chart review of old records, lab values/study results, and patient-given history, patient presented to walk-in 3/27/17 with acute left flank pain, nausea.  UA revealed 6-10 rbc per hpf, no infection.  Pain resolved by 3/29/17.  No gross hematuria.  No stones.  She's a current smoker, 9 pack years.  No family history of  malignancy.             I-PSS (International Prostate Symptom Score) 17:    How often have you had a sensation of incomplete emptying? 0    How often have you had to urinate again, in less than 2 hours? 3    How often have you stopped & started several times during urination? 2    How often have you found it difficult to postpone urintation? 5    How often have you had a weak stream? 1    How often have you had to push or strain to begin unrination? 0    How many times do you typically get up to urinate during the night? 0    Enter the patient's total score. 11    How would you feel if your present condition continued for life? 0=DELIGHTED            Past Medical History:   Diagnosis Date   • Microhematuria 2017   • Negative History of CA, HTN, DM, CAD, CVA, DVT, Asthma    • Negative History of ulcer, renal, liver, thyroid,TB,RF        Past Surgical History:   Procedure Laterality Date   •  DELIVERY+POSTPARTUM CARE       x3   • VAGINAL HYSTERECTOMY      TVH with ovaries intact       ALLERGIES:  No Known Allergies    Family History   Problem Relation Age of Onset   • Genitourinary Neg Hx      neg gu cancers       Social History     Social History   • Marital status:      Spouse name: Domo   • Number of children: 2   • Years of education: N/A     Social  History Main Topics   • Smoking status: Current Every Day Smoker     Packs/day: 0.50     Years: 18.00     Types: Cigarettes   • Smokeless tobacco: Never Used   • Alcohol use Yes      Comment: occ   • Drug use: No   • Sexual activity: Yes     Partners: Male     Birth control/ protection: Surgical      Comment: hysterectomy     Other Topics Concern   • None     Social History Narrative       Current Outpatient Prescriptions   Medication Sig Dispense Refill   • Multiple Vitamins-Minerals (MULTIVITAMIN ADULT PO)        No current facility-administered medications for this visit.        Review of Systems:  I personally reviewed the review of systems history obtained and entered into the chart by the nurse.  I have reviewed and confirmed the pertinent positives and negatives with the patient and agree with the documentation.        Physical exam:    Vitals:   Body mass index is 20.14 kg/(m^2).  Vitals:    04/18/17 1058   BP: 138/80   Pulse: 64   Resp: 16   Temp: 97.6 °F (36.4 °C)     General: The patient is well developed, well nourished, in no acute distress, appears stated age.   Psych:  Oriented to person, place, time; normal mood/affect  HEENT:  external nose is normal to inspection; hearing appears adequate/normal  Respiratory: Respiratory effort normal.   Cardiovascular: Regular rate and rhythm; no apparent peripheral edema in upper extremities  Abdomen: Soft, nontender and nondistended, no hepatosplenomegaly.  There are no other abdominal masses present, no hernias appreciated; stool sample not indicated.  No CVA tenderness  Skin: Warm and dry, normal turgor; no visible rashes or lesions, no palpable subcutaneous nodules  Musculoskeletal:  Normal gait  Genitourinary: deferred      Results Reviewed:      Recent Labs  Lab 04/10/17  1252   WBC 7.8   HGB 14.0   HCT 42.0       Recent Labs  Lab 04/10/17  1252   SODIUM 139   POTASSIUM 4.4   CHLORIDE 104   CO2 27   BUN 9   CREATININE 0.80   GLUCOSE 90   ALBUMIN 4.2     No  results for input(s): PTT, PT, INR in the last 8765 hours.      U/A:    Component      Latest Ref Rng & Units 4/10/2017   Specimen Description          CULTURE          REPORT STATUS          COLOR      YELLOW YELLOW   CLARITY       CLEAR   GLUCOSE(URINE)      NEGATIVE mg/dL NEGATIVE   BILIRUBIN      NEGATIVE NEGATIVE   KETONES      NEGATIVE mg/dL NEGATIVE   SPECIFIC GRAVITY      1.005 - 1.030 1.020   BLOOD      NEGATIVE LARGE (A)   pH      5.0 - 7.0 Units 5.0   PROTEIN(URINE)      NEGATIVE mg/dL NEGATIVE   UROBILINOGEN      0.0 - 1.0 mg/dL 0.2   NITRITE      NEGATIVE NEGATIVE   LEUKOCYTE ESTERASE      NEGATIVE NEGATIVE   URINE TYPE       URINE, CLEAN CATCH/MIDSTREAM   Squamous EPI'S      0 - 5 /hpf 11 to 25   RBC      0 - 3 /hpf 6 to 10   WBC      0 - 5 /hpf NONE SEEN   Bacteria      NONE SEEN /hpf NONE SEEN   Hyaline Casts      0 - 5 /lpf NONE SEEN   MUCOUS       PRESENT       Imaging Studies (personally reviewed):    CT ABDOMEN PELVIS FOR KIDNEY STONES WO CONTRAST     DATE: 3/27/2017 7:09 PM     HISTORY: FLANK PAIN, STONE KNOWN OR SUSPECTED     COMPARISON: None.     TECHNIQUE: Multiple axial images were acquired through the abdomen and  pelvis without the administration of intravenous. No oral contrast was  administered. Coronal and sagittal reformatted images were generated for  review.     FINDINGS:     Bowel: The appendix is not confidently visualized. There is some high  attenuation material within the right lower quadrant suggesting surgical  material although no surgical history is documented. No inflammatory  changes identified.      Large stool load involves the rectum. There is moderate stool and gaseous  distention of the colon. Ingested material within the stomach limits  detail. Negative small bowel.     Retroperitoneum/mesentery: No evidence of mesenteric or retroperitoneal  lymphadenopathy. No intraperitoneal free fluid or free air.      Liver and biliary system: Normal liver parenchyma. No mass.  Normal  biliary system.     Spleen: Negative.     Pancreas: Negative.     Adrenal glands: Negative.     Kidneys: No evidence of urolithiasis or hydroureteronephrosis. The ureters  and bladder are negative.     Pelvis: The uterus is not visualized suggesting surgical absence but again  no documentation of prior surgery. Unopacified bowel loops limit detail. No  adenopathy or abnormal fluid accumulation. In the regions negative.     Vascular: Negative. Abdominal aorta normal in caliber.     Abdominal wall: No hernia identified.     Lower thorax: The visualized lung bases are clear, without evidence of  pulmonary nodule or consolidation. No pleural effusion. Visualized heart  normal in size and pericardium normal in thickness.      Osseous structures and subcutaneous tissues: Degenerative changes of spine  without additional abnormality.           IMPRESSION:  1. No acute intraabdominal findings.  2. The appendix is not confidently visualized. High attenuation material is  visualized in the right lower quadrant suggesting the possibility of prior  appendectomy although no surgical history is documented. Clinical  correlation recommended.  3. Large stool burden of the rectum with moderate colonic stool and gaseous  distention of the colon suggesting constipation/obstipation.    _________________________________________________________________________    Assessment/Plan:  Fabienne Dexter is a 45 year old female having possibly recently passed a left sided kidney stone with microhematuria, now symptom free and UA without true hematuria.  - I explained that blood in the urine can arise from anywhere within the urinary system, including the kidneys, collecting system, ureters, bladder, urethra, or prostate in males.  I also explained that sometimes what is perceived as blood in the urine actually comes from outside the urinary tract, e.g. menstruation in women.  I described common causes of hematuria, including but not  limited to stones, tumors, inflammation/infections.  I explained that based on the American Urological Association Guidelines, microhematuria is defined as three or greater red blood cells (RBC) per high powered field (HPF) on a properly collected urinary specimen in the absence of an obvious benign cause.  This patient fits the definition of microhematuria, now resolved.  - smoking cessation encouraged  - establish with a PCP and obtain yearly UAs  - increase fluid intake, assuming this was a stone.  - A urinalysis was obtained today.  Patient will be contacted with results of the UA/UCx only if it requires treatment.  Otherwise, the results are to be considered normal.  Patient expressed understanding.    - Instructions provided as documented in the After Visit Summary.  - The patient and significant other indicated understanding of the diagnosis and agreed with the plan of care.         Thank you for allowing me to participate in the care of this patient.  Debo Alvarez MD     24-Nov-2021 09:33 24-Nov-2021 00:00

## 2021-11-24 NOTE — BRIEF OPERATIVE NOTE - OPERATION/FINDINGS
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 Pathology sent. Intravesical gemcitabine administered in PACU.

## 2021-11-24 NOTE — ASU DISCHARGE PLAN (ADULT/PEDIATRIC) - CARE PROVIDER_API CALL
Darrick Rawls)  Urology  [address] [place] [address], [place] [address]  Phone: [phone]  Fax: [phone]  Follow Up Time:

## 2021-11-24 NOTE — ASU DISCHARGE PLAN (ADULT/PEDIATRIC) - ASU DC SPECIAL INSTRUCTIONSFT
It is common to have blood in the urine after your procedure.  It may be pink or even red; inform your doctor if you have a significant amount of clots in the urine or if you are unable to void at all.  Be sure to drink plenty of fluids at all times.  -It is not uncommon to have some burning when you urinate.  -Provided that you are not restricted with fluids by your physician, you should drink 6-8 (8 oz.) glasses of fluid per day.  -You may resume your regular diet and regular medication regimen.    -You may shower or bathe.    -You may take over the counter pain medications such as Motrin and Tylenol as needed for pain.  Do not exceed 4 grams of Tylenol daily.  Each medication may be taken every 6 hours.  You may alternate these medications such that you take either one every 3 hours.  If you have severe pain that does not improve with the pain medication or you have persistent vomiting, call your doctor.  -As you have just underwent general anesthesia, you should refrain from driving, heavy lifting, smoking, alcohol consumption, or important decision making for the next 24 hours.  You may climb stairs and you may resume sexual activity.  -Call your physician if you have a fever over 101F.  -Make a follow up appointment for with your urologist.  -Dr. Rawls will reach out regarding results of biopsy  -Call your urologist during normal business hours with any other routine questions.

## 2021-11-24 NOTE — BRIEF OPERATIVE NOTE - NSICDXBRIEFPROCEDURE_GEN_ALL_CORE_FT
PROCEDURES:  Rigid cystoscopy 24-Nov-2021 09:15:39  Pop Thomas  Cystoscopy with fulguration and resection of bladder tumor 24-Nov-2021 09:16:05  Pop Thomas

## 2021-12-02 LAB — SURGICAL PATHOLOGY STUDY: SIGNIFICANT CHANGE UP

## 2021-12-27 NOTE — ASU DISCHARGE PLAN (ADULT/PEDIATRIC) - ACCOMPANIED BY
Los Skinner Spouse

## 2022-02-21 NOTE — H&P PST ADULT - PRO PAIN LIFE ADAPT
inability to sleep/decreased appetite Was The Patient On Physician Recommended Anticoagulation Therapy?: Please Select the Appropriate Response

## 2022-11-03 ENCOUNTER — APPOINTMENT (OUTPATIENT)
Dept: UROLOGY | Facility: CLINIC | Age: 77
End: 2022-11-03

## 2022-11-03 ENCOUNTER — OUTPATIENT (OUTPATIENT)
Dept: OUTPATIENT SERVICES | Facility: HOSPITAL | Age: 77
LOS: 1 days | End: 2022-11-03
Payer: MEDICARE

## 2022-11-03 VITALS
HEIGHT: 74 IN | WEIGHT: 263 LBS | DIASTOLIC BLOOD PRESSURE: 85 MMHG | RESPIRATION RATE: 16 BRPM | BODY MASS INDEX: 33.75 KG/M2 | HEART RATE: 87 BPM | SYSTOLIC BLOOD PRESSURE: 132 MMHG

## 2022-11-03 DIAGNOSIS — Z96.7 PRESENCE OF OTHER BONE AND TENDON IMPLANTS: Chronic | ICD-10-CM

## 2022-11-03 DIAGNOSIS — Z98.89 OTHER SPECIFIED POSTPROCEDURAL STATES: Chronic | ICD-10-CM

## 2022-11-03 DIAGNOSIS — Z96.649 PRESENCE OF UNSPECIFIED ARTIFICIAL HIP JOINT: Chronic | ICD-10-CM

## 2022-11-03 DIAGNOSIS — Z90.49 ACQUIRED ABSENCE OF OTHER SPECIFIED PARTS OF DIGESTIVE TRACT: Chronic | ICD-10-CM

## 2022-11-03 DIAGNOSIS — Z90.89 ACQUIRED ABSENCE OF OTHER ORGANS: Chronic | ICD-10-CM

## 2022-11-03 PROCEDURE — 52000 CYSTOURETHROSCOPY: CPT

## 2022-11-03 PROCEDURE — 88112 CYTOPATH CELL ENHANCE TECH: CPT | Mod: 26

## 2022-11-10 LAB — URINE CYTOLOGY: NORMAL

## 2022-11-14 DIAGNOSIS — C67.8 MALIGNANT NEOPLASM OF OVERLAPPING SITES OF BLADDER: ICD-10-CM

## 2023-01-01 NOTE — ASU DISCHARGE PLAN (ADULT/PEDIATRIC) - NURSING INSTRUCTIONS
DO NOT take any Tylenol (Acetaminophen) or narcotics containing Tylenol until after 02:30pm. You received Tylenol during your operation and it can cause damage to your liver if too much is taken within a 24 hour time period.
51

## 2023-05-15 NOTE — DISCHARGE NOTE ADULT - CONDITION (STATED IN TERMS THAT PERMIT A SPECIFIC MEASURABLE COMPARISON WITH CONDITION ON ADMISSION):
[facility]   ANNABEL BRAVO, [facility]       PATIENT'S NAME: Eli Monroe Jr  PREFERRED NAME: Eli Monroe   PRONOUNS: He/Him/His/Himself  MRN: [id number]  : 1984  ADDRESS: [address]  [place] [address]  ACCT. NUMBER: [id number]  DATE OF SERVICE: 5/15/23  PREFERRED PHONE: [phone]  May we leave a program related message: No  SERVICE MODALITY:  In-person    UNIVERSAL ADULT Mental Health DIAGNOSTIC ASSESSMENT    Yes, the patient has been informed that any other mental health professional providing mental health services to me will need access to this Diagnostic Assessment in order to develop a treatment plan and receive payment.          Identifying Information: Eli is a 39 year old,  cis gender male. The pronoun use throughout this assessment reflects the patient's chosen pronoun. He was referred for an assessment by [facility]. Eli attended the session alone. He presented as calm, appropriate, and with a flat affect. Speech and thought process is slowed down. Eli is a poor historian throughout assessment with little to no interest in participating.       Reason for Referral: Eli is receiving this service for additional mental health services following his discharge. Requested by [facility].       Patient's Statement of Presenting Concern & Functional: Eli Monroe Jr is a 39 year old male with a significant history of mental health issues. He is currently in the inpatient behavioral health unit at [facility]. He was admitted 23 and was last on the unit in 2023. Eli has a history of psychosis, catatonia, suicidal ideation, and persistent depression. Per medical records he reportedly had been talking to himself, burning himself, and in a semi Catatonic state at his current place of living where staff state they are concerned because this is abnormal behavior for him. They also report that when asked he denies all of these  "behaviors even though they have been witnessed. When discussing with Eli his thoughts on what had brought him into the hospital he is unable to give this writer an answer as he just looked down at the floor and stated \"I don't know\".        Impairments: Eli is unable to express how his symptoms are affecting his ability to function. Looking at medical records it show that his symptoms have resulted in the following functional impairments: health maintenance, management of the household and or completion of tasks, money management, operation of a motor vehicle, organization, relationship(s), self-care, social interactions and work / vocational responsibilities. Eli has a long history of the inability to maintain housing due to his mental health resulting in needing assisted living. He has a history of non compliance with services and poor follow through.     Onset/Duration/Pattern of Symptoms noted above: Reports of feeling nervous, anxious or on edge, not being able to stop or control worrying about different things, has trouble relaxing, will be restless and find it is hard to sit still, becomes easily annoyed or irritable, feels afraid as if something awful might happen. Has little interest or pleasure in doing things, feels down, depressed, and hopeless, has trouble falling asleep, feels tired and has little energy, has poor appetite, feeling bad about self, trouble concentrating on things, moving or speaking slowly, having thoughts that they would be better off dead or of hurting self in some way.    Current Stressors/Losses/Disappointments: Eli is unable to describe past/current stressors, losses, or disappointments. He states \"I don't know\". Per medical records it shows that Eli has in the past struggled with stress of homelessness and remaining sober.       Mental Health History: Eli has a significant history with mental health. Per medical records his history at [facility] started back " in 2017. Where he was admitted with increased depression and suicidal ideation. Following this admission he was again seen twice in 2019 where he was committed MI/CD. He returned 4x in 2023 including January, February, March, and May. Looking further he has had multiple past ED visits as well. When speaking with Eli he is a poor historian of these events and states he hasn't been hospitalized in along time. Eli reports he is unsure of when his mental health symptoms begun or when he was first diagnosed. Eli has a hx of SI and SIB. He has no hx of attempting suicide. Last SIB was at his place of living where he was found burning his shirt and skin. Eli denies SI today during this assessment. Per records Eli has a history of auditory and visual hallucinations. He states people talk to him and people being in the room with him. States he has out of body experiences where he is looking at himself in the room. Eli denies any access to lethal means.      Have you currently or in the past had trouble with physical aggression (If yes, describe)? No      From whom do you receive support? (family/friends/agency) Family    Is there anything in your life (current or history) that is satisfying to you (include leisure interests/hobbies)? Eli shook his head no    Eli is currently receiving the following services: [facility] Worker- Rosey [phone]. [facility] Counseling,PCP Alex Crow, Medication Management- [facility]- Kari Stinson.    Psychiatric Hospitalizations: [facility]. See above.   Eli reports a history of civil commitment. MI/CD through [facility] on 11/27/19.      Rating Scales:    PHQ9:        8/29/2017     1:00 PM 5/19/2023     1:00 PM   PHQ-9 SCORE   PHQ-9 Total Score 12 15   ;    GAD7:        8/29/2017     1:00 PM 5/19/2023     1:00 PM   KENNY-7 SCORE   Total Score 15 12       Eli reports the following understanding of his diagnosis: Unable to  "report.      Personal safety summary:  After gathering the above information, Eli  presents the following high risk factors for suicide: poor sleep, panic,extreme anxiety, mood disorder with psychosis/paranoia, past serious attempts - especially recent and hopelessness, worthlessness.  Eli denies current fears or concerns for personal safety.    Eli has the following Protective Factors: Positive coping skills, Positive problem-solving skills, Positive social support and Positive therapeutic releationships with outpatient services.       Chemical Health History:     Substance Use:    Eli has a significant history of substance use including methamphetamines, opiates, morphine, benzo's, and THC. He is unable to report when his chemical use first started. He reportedly has been to treatment once at [facility] over a few years ago. He shares he is unsure how long he has been sober but states \"it's been a long time\". Eli did not report a family history of substance use concerns; see medical history section for details. Patient has been to detox.    Eli is not currently receiving any chemical dependency treatment. Eli reported the following problems as a result of their substance use:  family problems, financial problems, legal issues, occupational / vocational problems and relationship problems. Substance Use: When using per medical records, Eli will experience blackouts, hangovers, substance related decrease in work performance, family relationship problems due to substance use and cravings/urges to use. Eli denies a history of gambling and denies a history of gambling treatment.       CAGE- AID:    CAGE-AID:    Have you ever felt you ought to cut down on your drinking or drug use?   No    Have people annoyed you by criticizing your drinking or drug use?   No    Have you ever felt bad or guilty about your drinking or drug use?   No    Have you ever had a drink or used drugs first thing in " the morning to steady your nerves or to get rid of a hangover?  No    Do you feel these issues have been adequately addressed?   Yes     Chemical Dependency Assessment Recommended?  No     Legal History:    Pt has a history of warrants and detention time. According to records he was a release to police during one of his hospital stays. This was back in 2017. Eli shared this was because of drugs and went to detox and then treatment. He is unable to share when his first and last encounter with law enforcement was.     Life Situation (Employment/School/Finances/Basic Needs):    Eli reveals he did not graduate highschool though did receive his GED. He is unsure of when this happened. He reports receiving general assistance through the state and this is his main source of income to pay his bills. He states he has tried to get social security income but was denied. Has not tried since. Eli states he receives all his basic needs at his place of living at [place]. The safety/stability of this environment is described as: Stable. Eli is currently unemployed. He is not able to recall his previous employment status or where he had last worked. He shares it has been a long time since he worked. He states this is not a stressor for him. Eli denies concerns regarding his current ability to meet basic needs.     Social/Family History:    Per medical records Eli was born and raised in [place], [place]. He grew up with his step mom and dad. He has 4 sisters and 1 brother. Eli is the oldest. He states his childhood was good. During this assessment Eli shares he does not know if his parents are alive or not and explains he has not contacted them in a long time. Eli identifies his relationship status as: single. Eli identifies his sexual orientation as: opposite sex. He denies sexual health concerns. Eli reports having 0 children. He denies personal  experience. Eli reported no family history of  mental health issues.    Significant Losses / Trauma / Abuse / Neglect Issues / Developmental Incidents:    Eli denies any form of abuse/ trauma.     Mosque Preference/Spiritual Beliefs/Cultural Considerations: Eli states he does not have any Anabaptism preferences, spiritual beliefs, or cultural considerations.     A. Ethnic Self-Identification:  Eli self-identifies his race/ethnicities as:  and his preferred language to be English.   Eli reports he does not need the assistance of an . Eli  reports he does not need other support or modifications involved in therapy.      Strengths/Vulnerabilities:   Eli identifies his personal strengths as: caring, committed to sobriety, empathetic, good listener, has a previous history of therapy, open to learning and open to suggestions / feedback .  Things that may interfere with the clients success in treatment include: few friends, lack of family support and transportation concerns.   Other identified areas of vulnerability include: Suicidal Ideation  Poor impulse control  Anxiety with/without panic attacks  Depressive symptoms  Cognitive impairment  Learning barrier.     Medical History / Physical Health Screen:     Primary Care Physician: Eli has a [facility], [facility].  Last Physical Exam: greater than a year ago and client was encouraged to schedule an exam with PCP.    Mental Health Medication Management Provider / Psychiatrist: Eli has a psychiatrist whose name and location are: [facility]- Kari Stinson.    Current medications including prescription, non-prescription, herbals, dietary aids and vitamins:  Per medical records:     Current Facility-Administered Medications:      acetaminophen (TYLENOL) tablet 650 mg, 650 mg, Oral, Q4H DELFINAN, Westley Poole,      alum & mag hydroxide-simethicone (MAALOX) 200-200-20 MG/5ML suspension, , , ,      alum & mag hydroxide-simethicone  (MAALOX) suspension 30 mL, 30 mL, Oral, Q4H PRN, Westley Poole DO, 30 mL at 05/07/23 0220     hydrOXYzine (ATARAX) tablet 25 mg, 25 mg, Oral, Q6H PRN, Westley Poole DO, 25 mg at 05/16/23 1528     lithium (ESKALITH CR/LITHOBID) CR tablet 900 mg, 900 mg, Oral, At Bedtime, Alpa Garcia, MERLENE, 900 mg at 05/18/23 2017     LORazepam (ATIVAN) tablet 1.5 mg, 1.5 mg, Oral, TID, Alpa Garcia NP, 1.5 mg at 05/19/23 1327     magic mouthwash suspension (diphenhydramine, lidocaine, aluminum-magnesium & simethicone), 10 mL, Swish & Swallow, Q6H PRN, Viki Lamar, CNP     melatonin tablet 6 mg, 6 mg, Oral, At Bedtime PRN, Oziel Baird MD, 6 mg at 05/18/23 2041     metFORMIN (GLUCOPHAGE) tablet 500 mg, 500 mg, Oral, BID w/meals, Westley Poole DO, 500 mg at 05/19/23 0850     nicotine (NICORETTE) gum 2 mg, 2 mg, Buccal, Q2H PRN, Oziel Baird MD, 2 mg at 05/19/23 1118     nicotine (NICORETTE) lozenge 4 mg, 4 mg, Buccal, Q1H PRN, Alpa Garcia NP, 4 mg at 05/19/23 1335     OLANZapine (zyPREXA) tablet 10 mg, 10 mg, Oral, Q6H PRN, 10 mg at 05/01/23 1432 **OR** OLANZapine (zyPREXA) injection 10 mg, 10 mg, Intramuscular, Q6H PRN, Westley Poole DO     paliperidone ER (INVEGA) 24 hr tablet 3 mg, 3 mg, Oral, BID, Westley Poole DO, 3 mg at 05/19/23 0850     senna-docusate (SENOKOT-S/PERICOLACE) 8.6-50 MG per tablet 1 tablet, 1 tablet, Oral, BID PRN, Westley Poole DO Merlyn reports current medications are: Effective.   Eli describes taking his medications as: Requires assistance.  Eli reports taking prescribed medications as prescribed.        Medical:  No past medical history on file.    Surgical:  No past surgical history on file.  Allergy:   Eli reports   Allergies   Allergen Reactions     Seroquel [Quetiapine] Other (See Comments)     Qt prolonged     Trazodone Other (See Comments)     prolonged qt     Seasonal Allergies      Pollen--red  eyes,sneezing        Family History of Medical, Mental Health and/or Substance Use problems:  Per client report:   Family History   Problem Relation Age of Onset     Depression Mother      Anxiety Disorder Mother      Diabetes No family hx of      Hypertension No family hx of      Hyperlipidemia No family hx of      Colon Cancer No family hx of      Prostate Cancer No family hx of      Asthma No family hx of        Eli reports no current medical concerns.        Per completion of the Medical History / Physical Health Screen, is there a recommendation to see / follow up with a primary care physician/clinic?    Yes, medications  physical exam    Clinical Findings      Current Mental Status Exam:   Appearance:  Appropriate    Eye Contact:  Fair   Psychomotor:  Catatonic       Gait / station:  no problem  Attitude / Demeanor: Guarded   Speech      Rate / Production: Monotone  Slow       Volume:  Soft  volume      Language:  no problems  Mood:   Depressed   Affect:   Flat    Thought Content: Clear   Thought Process: Blocking       Associations: No loosening of associations  Insight:   Fair   Judgment:  Intact   Orientation:  All  Attention/concentration: Fair      Safety Assessment:   Pitt Suicide Severity Rating Scale (Short Version)      2/5/2023     1:00 PM 3/10/2023     4:48 PM 3/10/2023     5:10 PM 3/11/2023    10:00 AM 5/1/2023     9:29 AM 5/2/2023    11:00 AM 5/19/2023     1:00 PM   Pitt Suicide Severity Rating (Short Version)   Over the past 2 weeks have you felt down, depressed, or hopeless?  yes   no     Over the past 2 weeks have you had thoughts of killing yourself?  no   no     Have you ever attempted to kill yourself?  no   no     Q1 Wished to be Dead (Past Month) no   no  no yes   Q2 Suicidal Thoughts (Past Month) yes   no  no yes   Q3 Suicidal Thought Method no   no  no no   Q4 Suicidal Intent without Specific Plan no   no   no   Q5 Suicide Intent with Specific Plan no   no   no   Q6 Suicide  Behavior (Lifetime) no   no   no   Within the Past 3 Months?    no      Level of Risk per Screen low risk   low risk   low risk   High Risk Required Interventions   Provider notified;On continuous in person observation       Required Interventions   Room searched;Room made safe;Patient searched;Belongings removed       Comments       Patient is currently on the inpatient acute BH unit     Patient denies current homicidal ideation and behaviors.  Patient denies current self-injurious ideation and behaviors.    Patient denied risk behaviors associated with substance use.  Patient denies any high risk behaviors associated with mental health symptoms.  Patient reports the following current concerns for their personal safety: None.  Patient reports there are no firearms in the house.    History of Safety Concerns:  Patient denied a history of homicidal ideation.     Patient denied a history of personal safety concerns.    Patient denied a history of assaultive behaviors.    Patient denied a history of sexual assault behaviors.     Patient denied a history of risk behaviors associated with substance use.  Patient denies any history of high risk behaviors associated with mental health symptoms.  Patient reports the following protective factors: Sense of responsibility to family     Risk Plan:  See Recommendations for Safety and Risk Management Plan    Review of Symptoms per patient report:  Depression: Lack of interest, Change in energy level, Difficulties concentrating, Psychomotor slowing or agitation, Suicidal ideation, Feelings of hopelessness, Low self-worth, Feeling sad, down, or depressed and Withdrawn  Liz:  Impulsiveness  Psychosis: Auditory hallucinations  Anxiety: Excessive worry, Nervousness and Social anxiety  Panic:  No symptoms  Post Traumatic Stress Disorder:  Impaired functioning and Dissociation   Eating Disorder: No Symptoms  ADD / ADHD:  No symptoms, Poor organizational skills, Forgetful and  Impulsive  Conduct Disorder: No symptoms  Autism Spectrum Disorder: No symptoms  Obsessive Compulsive Disorder: No Symptoms     Diagnostic Criteria:   Bipolar I Hypomanic Episode  D. The disturbance of mood and the change in fuctioning are overservable by others  Diagnostic Criteria:   Generalized Anxiety Disorder  A. Excessive anxiety and worry about a number of events or activities (such as work or school performance).   B. The person finds it difficult to control the worry.   - Difficulty concentrating or mind going blank.   E. The anxiety, worry, or physical symptoms cause clinically significant distress or impairment in social, occupational, or other important areas of functioning.   DSM5 Diagnoses: Behavioral and Medical Diagnosis: 296.54 Bipolar I Disorder Current or Most Recent Episode Depressed, with psychotic features;     300.02 (F41.1) Generalized Anxiety Disorder;      Per H&P by Westley Poole on 5/1/23  3. Stimulant (meth) use disorder, in sustained remission  4. Opioid use disorder, mild in severity  5. Tardive dyskinesia       Functional Status:  Patient reports the following functional impairments: management of the household and or completion of tasks, money management, operation of a motor vehicle, organization, relationship(s), self-care, social interactions, use of public transportation and work / vocational responsibilities.     WHODAS:       5/19/2023     1:00 PM   WHODAS 2.0 Total Score   Total Score 29     Nonprogrammatic care:  Patient is requesting basic services to address current mental health concerns.    Clinical Summary:  1. Reason for assessment: To obtain further outpatient services.    2. Psychosocial, Cultural and Contextual Factors: none .  3. Principal DSM5 Diagnoses  (Sustained by DSM5 Criteria Listed Above):  300.02 (F41.1) Generalized Anxiety Disorder.  4. Other Diagnoses that is relevant to services:   296.54 Bipolar I Disorder Current or Most Recent Episode Depressed, with  psychotic features.  5. Prognosis: Return to Baseline Functioning and Relieve Acute Symptoms.  6. Likely result of symptoms if not treated:     Recommendations:     1.  Plan for Safety and Risk Management:Recommended that patient call 911 or go to the local ED should there be a change in any of these risk factors.. Report to child / adult protection services was completed within 24 hours of assessment.     2. Patient's identified no cultural or Mormon concerns at this time.      3. Resources/Service Plan:    services are not indicated.   Modifications to assist communication are not indicated.   Additional disability accommodations are not indicated.   The patient  MAY utilize the Alpha Stimulator therapy.   Patient Does not have a pacemaker.       4. Collaboration: Collaboration / coordination of treatment will be initiated with the following support professionals: Adult Rehabilitative Mental Health Services ([facility]), Behavioral Health Clinician (BHC), outpatient therapist, psychiatry and Targeted Case Management (TCM).      5.  Referrals:    Qualifies for [facility] (Adult Rehabilitative Mental Health Services) to rehabilitate the areas of functional impairments.    It is my professional opinion that patient:     1. Has symptoms of mental illness that impair function in the following areas:       Mental health symptoms, Securing or maintaining employment, Interpersonal skills, Medical health, Obtaining / maintaining housing, Self-care / Independent living and Using transportation     2. Rehabilitative mental health services would reduce symptoms to allow regulated, restored or improved functioning.  Maintain stability, function, preventing risk of significant functional decompensation or more restrictive service setting.      3. Has the cognitive capacity to benefit from rehabilitative mental health techniques and methods.     The following referral(s) will be initiated: [facility].      A Release of  Information has been obtained for the following: Behavioral Health Clinician (BHC) and Targeted Case Management (TCM).    6. YAN: No recommendations at this time. If Eli relapses, a comp assessment is recommended.      7. Records: These were reviewed at time of assessment. Information in this assessment was obtained from the medical record and provided by patient who is a limited historian. Patient will have open access to their mental health medical record.      Provider Name/ Credentials:  ANNABEL BRAVO, DEBI   May 15, 2023         stable

## 2023-12-09 NOTE — H&P PST ADULT - MS EXT PE MLT D E PC
Medication: Hydrocodone   Sig: take 1 tab by mouth 2 times daily as needed for pain   Qty: 60  Dosage: 7.5 / 325 mg   Last Refill: 11/7/17  Pharmacy: [facility] [address] [place] [address]   Patient last seen: 8/4/17  Next appointment to be seen: none    Please call back patient is wondering if he is due to get his A1C checked.     Thank you     
General
no pedal edema

## 2024-01-24 NOTE — H&P PST ADULT - NECK DETAILS
no diplopia/no photophobia/no lacrimation L/no lacrimation R/no blurred vision L/no blurred vision R supple/no JVD

## 2024-05-21 ENCOUNTER — APPOINTMENT (OUTPATIENT)
Dept: UROLOGY | Facility: CLINIC | Age: 79
End: 2024-05-21
Payer: MEDICARE

## 2024-05-21 ENCOUNTER — APPOINTMENT (OUTPATIENT)
Dept: UROLOGY | Facility: CLINIC | Age: 79
End: 2024-05-21

## 2024-05-21 ENCOUNTER — OUTPATIENT (OUTPATIENT)
Dept: OUTPATIENT SERVICES | Facility: HOSPITAL | Age: 79
LOS: 1 days | End: 2024-05-21
Payer: MEDICARE

## 2024-05-21 VITALS
DIASTOLIC BLOOD PRESSURE: 76 MMHG | HEART RATE: 89 BPM | SYSTOLIC BLOOD PRESSURE: 139 MMHG | OXYGEN SATURATION: 99 % | RESPIRATION RATE: 16 BRPM

## 2024-05-21 DIAGNOSIS — R35.0 FREQUENCY OF MICTURITION: ICD-10-CM

## 2024-05-21 DIAGNOSIS — Z96.7 PRESENCE OF OTHER BONE AND TENDON IMPLANTS: Chronic | ICD-10-CM

## 2024-05-21 DIAGNOSIS — Z90.89 ACQUIRED ABSENCE OF OTHER ORGANS: Chronic | ICD-10-CM

## 2024-05-21 DIAGNOSIS — Z96.649 PRESENCE OF UNSPECIFIED ARTIFICIAL HIP JOINT: Chronic | ICD-10-CM

## 2024-05-21 DIAGNOSIS — Z98.89 OTHER SPECIFIED POSTPROCEDURAL STATES: Chronic | ICD-10-CM

## 2024-05-21 DIAGNOSIS — Z90.49 ACQUIRED ABSENCE OF OTHER SPECIFIED PARTS OF DIGESTIVE TRACT: Chronic | ICD-10-CM

## 2024-05-21 DIAGNOSIS — C67.8 MALIGNANT NEOPLASM OF OVERLAPPING SITES OF BLADDER: ICD-10-CM

## 2024-05-21 PROCEDURE — 52000 CYSTOURETHROSCOPY: CPT

## 2024-05-22 DIAGNOSIS — C67.8 MALIGNANT NEOPLASM OF OVERLAPPING SITES OF BLADDER: ICD-10-CM

## 2024-06-20 NOTE — H&P PST ADULT - REASON FOR ADMISSION
Anesthesia Pre Eval Note    Anesthesia ROS/Med Hx        Anesthetic Complication History:    Patient does not have a history of anesthetic complications      Pulmonary Review:  Patient does not have a pulmonary history      Neuro/Psych Review:  Patient does not have a neuro/psych history         Cardiovascular Review:   Exercise tolerance: good (>4 METS)    GI/HEPATIC/RENAL Review:  Patient does not have a GI/hepatic/renalhistory       End/Other Review:  Patient does not have an endo/other history    Additional Results:      ALLERGIES:   -- Augmentin [Amoxicillin-Pot Clavulanate] -- HIVES   No results found for: \"WBC\", \"RBC\", \"HGB\", \"HCT\", \"MCV\", \"MCH\", \"MCHC\", \"RDWCV\", \"SODIUM\", \"POTASSIUM\", \"CHLORIDE\", \"CO2\", \"GLUCOSE\", \"BUN\", \"CREATININE\", \"GFRESTIMATE\", \"EGFRNONAFR\", \"GFRA\", \"GFRNA\", \"CALCIUM\", \"HCG\", \"PLT\", \"PTT\", \"INR\"   History reviewed. No pertinent past medical history.  History reviewed. No pertinent surgical history.   Prior to Admission medications :  Medication diclofenac (VOLTAREN) 75 MG EC tablet, Sig Take 1 tablet by mouth in the morning and 1 tablet in the evening., Start Date 6/18/24, End Date , Taking? Yes, Authorizing Provider Edis Haq MD         Patient Vitals for the past 24 hrs:   BP Temp Temp src Pulse Resp SpO2 Height Weight   06/20/24 1040 (!) 150/66 36.3 °C (97.4 °F) Temporal 64 18 99 % 5' 6\" (1.676 m) 66.2 kg (145 lb 13.4 oz)       Social history reviewed:  Social History     Tobacco Use   Smoking Status Never   Smokeless Tobacco Never        E-Cigarette/Vaping Substances & Devices       Social History     Substance and Sexual Activity   Alcohol Use Never           Relevant Problems   No relevant active problems       Physical Exam     Airway   Mallampati: II  TM Distance: >3 FB  Neck ROM: Full  TMJ Mobility: Good    Cardiovascular    Cardio Rhythm: Regular  Cardio Rate: Normal    Head Assessment  Head assessment: Normocephalic    General Assessment  General Assessment: Alert  and oriented    Dental Exam  Dental exam normal    Pulmonary Exam  Pulmonary exam normal    Abdominal Exam  Abdominal exam normal      Anesthesia Plan:    ASA Status: 1  Anesthesia Type: General    Induction: Intravenous  Preferred Airway Type: ETT  Maintenance: Inhalational  Premedication: Oral and IV      Post-op Pain Management: Per Surgeon      Checklist  Reviewed: Patient Summary, NPO Status, Past Med History, Problem list, Medications, Allergies and Anesthesia Record  Consent/Risks Discussed Statement:  The proposed anesthetic plan, including its risks and benefits, have been discussed with the Patient and Father along with the risks and benefits of alternatives. Questions were encouraged and answered and the patient and/or representative understands and agrees to proceed.        I discussed with the patient (and/or patient's legal representative) the risks and benefits of the proposed anesthesia plan, General, which may include services performed by other anesthesia providers.    Alternative anesthesia plans, if available, were reviewed with the patient (and/or patient's legal representative). Discussion has been held with the patient (and/or patient's legal representative) regarding risks of anesthesia, which include allergic reaction, anxiety, aspiration, dental injury, death, intra-operative awareness, hypotension, depressed breathing, nausea, sore throat, vomiting and oral injury and emergent situations that may require change in anesthesia plan.    The patient (and/or patient's legal representative) has indicated understanding, his/her questions have been answered, and he/she wishes to proceed with the planned anesthetic.    Blood Products: Not Anticipated     Gross hematuria

## 2024-11-12 NOTE — H&P PST ADULT - PROBLEM/PLAN-2
Erythromycin Pregnancy And Lactation Text: This medication is Pregnancy Category B and is considered safe during pregnancy. It is also excreted in breast milk. Birth Control Pills Counseling: Birth Control Pill Counseling: I discussed with the patient the potential side effects of OCPs including but not limited to increased risk of stroke, heart attack, thrombophlebitis, deep venous thrombosis, hepatic adenomas, breast changes, GI upset, headaches, and depression.  The patient verbalized understanding of the proper use and possible adverse effects of OCPs. All of the patient's questions and concerns were addressed. Sarecycline Counseling: Patient advised regarding possible photosensitivity and discoloration of the teeth, skin, lips, tongue and gums.  Patient instructed to avoid sunlight, if possible.  When exposed to sunlight, patients should wear protective clothing, sunglasses, and sunscreen.  The patient was instructed to call the office immediately if the following severe adverse effects occur:  hearing changes, easy bruising/bleeding, severe headache, or vision changes.  The patient verbalized understanding of the proper use and possible adverse effects of sarecycline.  All of the patient's questions and concerns were addressed. Topical Sulfur Applications Pregnancy And Lactation Text: This medication is Pregnancy Category C and has an unknown safety profile during pregnancy. It is unknown if this topical medication is excreted in breast milk. Topical Retinoid counseling:  Patient advised to apply a pea-sized amount only at bedtime and wait 30 minutes after washing their face before applying.  If too drying, patient may add a non-comedogenic moisturizer. The patient verbalized understanding of the proper use and possible adverse effects of retinoids.  All of the patient's questions and concerns were addressed. Tetracycline Pregnancy And Lactation Text: This medication is Pregnancy Category D and not consider safe during pregnancy. It is also excreted in breast milk. Doxycycline Pregnancy And Lactation Text: This medication is Pregnancy Category D and not consider safe during pregnancy. It is also excreted in breast milk but is considered safe for shorter treatment courses. Minocycline Counseling: Patient advised regarding possible photosensitivity and discoloration of the teeth, skin, lips, tongue and gums.  Patient instructed to avoid sunlight, if possible.  When exposed to sunlight, patients should wear protective clothing, sunglasses, and sunscreen.  The patient was instructed to call the office immediately if the following severe adverse effects occur:  hearing changes, easy bruising/bleeding, severe headache, or vision changes.  The patient verbalized understanding of the proper use and possible adverse effects of minocycline.  All of the patient's questions and concerns were addressed. Bactrim Counseling:  I discussed with the patient the risks of sulfa antibiotics including but not limited to GI upset, allergic reaction, drug rash, diarrhea, dizziness, photosensitivity, and yeast infections.  Rarely, more serious reactions can occur including but not limited to aplastic anemia, agranulocytosis, methemoglobinemia, blood dyscrasias, liver or kidney failure, lung infiltrates or desquamative/blistering drug rashes. Spironolactone Pregnancy And Lactation Text: This medication can cause feminization of the male fetus and should be avoided during pregnancy. The active metabolite is also found in breast milk. Topical Clindamycin Pregnancy And Lactation Text: This medication is Pregnancy Category B and is considered safe during pregnancy. It is unknown if it is excreted in breast milk. High Dose Vitamin A Counseling: Side effects reviewed, pt to contact office should one occur. Benzoyl Peroxide Counseling: Patient counseled that medicine may cause skin irritation and bleach clothing.  In the event of skin irritation, the patient was advised to reduce the amount of the drug applied or use it less frequently.   The patient verbalized understanding of the proper use and possible adverse effects of benzoyl peroxide.  All of the patient's questions and concerns were addressed. Azithromycin Counseling:  I discussed with the patient the risks of azithromycin including but not limited to GI upset, allergic reaction, drug rash, diarrhea, and yeast infections. Dapsone Pregnancy And Lactation Text: This medication is Pregnancy Category C and is not considered safe during pregnancy or breast feeding. Tazorac Pregnancy And Lactation Text: This medication is not safe during pregnancy. It is unknown if this medication is excreted in breast milk. Azelaic Acid Counseling: Patient counseled that medicine may cause skin irritation and to avoid applying near the eyes.  In the event of skin irritation, the patient was advised to reduce the amount of the drug applied or use it less frequently.   The patient verbalized understanding of the proper use and possible adverse effects of azelaic acid.  All of the patient's questions and concerns were addressed. Birth Control Pills Pregnancy And Lactation Text: This medication should be avoided if pregnant and for the first 30 days post-partum. Isotretinoin Counseling: Patient should get monthly blood tests, not donate blood, not drive at night if vision affected, not share medication, and not undergo elective surgery for 6 months after tx completed. Side effects reviewed, pt to contact office should one occur. Winlevi Counseling:  I discussed with the patient the risks of topical clascoterone including but not limited to erythema, scaling, itching, and stinging. Patient voiced their understanding. Aklief counseling:  Patient advised to apply a pea-sized amount only at bedtime and wait 30 minutes after washing their face before applying.  If too drying, patient may add a non-comedogenic moisturizer.  The most commonly reported side effects including irritation, redness, scaling, dryness, stinging, burning, itching, and increased risk of sunburn.  The patient verbalized understanding of the proper use and possible adverse effects of retinoids.  All of the patient's questions and concerns were addressed. Topical Retinoid Pregnancy And Lactation Text: This medication is Pregnancy Category C. It is unknown if this medication is excreted in breast milk. Detail Level: Detailed Bactrim Pregnancy And Lactation Text: This medication is Pregnancy Category D and is known to cause fetal risk.  It is also excreted in breast milk. Erythromycin Counseling:  I discussed with the patient the risks of erythromycin including but not limited to GI upset, allergic reaction, drug rash, diarrhea, increase in liver enzymes, and yeast infections. Benzoyl Peroxide Pregnancy And Lactation Text: This medication is Pregnancy Category C. It is unknown if benzoyl peroxide is excreted in breast milk. High Dose Vitamin A Pregnancy And Lactation Text: High dose vitamin A therapy is contraindicated during pregnancy and breast feeding. Doxycycline Counseling:  Patient counseled regarding possible photosensitivity and increased risk for sunburn.  Patient instructed to avoid sunlight, if possible.  When exposed to sunlight, patients should wear protective clothing, sunglasses, and sunscreen.  The patient was instructed to call the office immediately if the following severe adverse effects occur:  hearing changes, easy bruising/bleeding, severe headache, or vision changes.  The patient verbalized understanding of the proper use and possible adverse effects of doxycycline.  All of the patient's questions and concerns were addressed. Tetracycline Counseling: Patient counseled regarding possible photosensitivity and increased risk for sunburn.  Patient instructed to avoid sunlight, if possible.  When exposed to sunlight, patients should wear protective clothing, sunglasses, and sunscreen.  The patient was instructed to call the office immediately if the following severe adverse effects occur:  hearing changes, easy bruising/bleeding, severe headache, or vision changes.  The patient verbalized understanding of the proper use and possible adverse effects of tetracycline.  All of the patient's questions and concerns were addressed. Patient understands to avoid pregnancy while on therapy due to potential birth defects. Topical Sulfur Applications Counseling: Topical Sulfur Counseling: Patient counseled that this medication may cause skin irritation or allergic reactions.  In the event of skin irritation, the patient was advised to reduce the amount of the drug applied or use it less frequently.   The patient verbalized understanding of the proper use and possible adverse effects of topical sulfur application.  All of the patient's questions and concerns were addressed. Include Pregnancy/Lactation Warning?: No Azithromycin Pregnancy And Lactation Text: This medication is considered safe during pregnancy and is also secreted in breast milk. Topical Clindamycin Counseling: Patient counseled that this medication may cause skin irritation or allergic reactions.  In the event of skin irritation, the patient was advised to reduce the amount of the drug applied or use it less frequently.   The patient verbalized understanding of the proper use and possible adverse effects of clindamycin.  All of the patient's questions and concerns were addressed. Isotretinoin Pregnancy And Lactation Text: This medication is Pregnancy Category X and is considered extremely dangerous during pregnancy. It is unknown if it is excreted in breast milk. Dapsone Counseling: I discussed with the patient the risks of dapsone including but not limited to hemolytic anemia, agranulocytosis, rashes, methemoglobinemia, kidney failure, peripheral neuropathy, headaches, GI upset, and liver toxicity.  Patients who start dapsone require monitoring including baseline LFTs and weekly CBCs for the first month, then every month thereafter.  The patient verbalized understanding of the proper use and possible adverse effects of dapsone.  All of the patient's questions and concerns were addressed. Spironolactone Counseling: Patient advised regarding risks of diarrhea, abdominal pain, hyperkalemia, birth defects (for female patients), liver toxicity and renal toxicity. The patient may need blood work to monitor liver and kidney function and potassium levels while on therapy. The patient verbalized understanding of the proper use and possible adverse effects of spironolactone.  All of the patient's questions and concerns were addressed. Azelaic Acid Pregnancy And Lactation Text: This medication is considered safe during pregnancy and breast feeding. Aklief Pregnancy And Lactation Text: It is unknown if this medication is safe to use during pregnancy.  It is unknown if this medication is excreted in breast milk.  Breastfeeding women should use the topical cream on the smallest area of the skin for the shortest time needed while breastfeeding.  Do not apply to nipple and areola. Winlevi Pregnancy And Lactation Text: This medication is considered safe during pregnancy and breastfeeding. Tazorac Counseling:  Patient advised that medication is irritating and drying.  Patient may need to apply sparingly and wash off after an hour before eventually leaving it on overnight.  The patient verbalized understanding of the proper use and possible adverse effects of tazorac.  All of the patient's questions and concerns were addressed. Detail Level: Generalized DISPLAY PLAN FREE TEXT